# Patient Record
Sex: MALE | Race: WHITE | NOT HISPANIC OR LATINO
[De-identification: names, ages, dates, MRNs, and addresses within clinical notes are randomized per-mention and may not be internally consistent; named-entity substitution may affect disease eponyms.]

---

## 2021-02-03 ENCOUNTER — APPOINTMENT (OUTPATIENT)
Dept: FAMILY MEDICINE | Facility: CLINIC | Age: 66
End: 2021-02-03
Payer: MEDICARE

## 2021-02-03 ENCOUNTER — NON-APPOINTMENT (OUTPATIENT)
Age: 66
End: 2021-02-03

## 2021-02-03 VITALS
DIASTOLIC BLOOD PRESSURE: 70 MMHG | BODY MASS INDEX: 22.89 KG/M2 | HEIGHT: 72 IN | WEIGHT: 169 LBS | SYSTOLIC BLOOD PRESSURE: 150 MMHG | HEART RATE: 68 BPM

## 2021-02-03 DIAGNOSIS — Z78.9 OTHER SPECIFIED HEALTH STATUS: ICD-10-CM

## 2021-02-03 DIAGNOSIS — F17.290 NICOTINE DEPENDENCE, OTHER TOBACCO PRODUCT, UNCOMPLICATED: ICD-10-CM

## 2021-02-03 PROCEDURE — 93000 ELECTROCARDIOGRAM COMPLETE: CPT | Mod: 59

## 2021-02-03 PROCEDURE — G0442 ANNUAL ALCOHOL SCREEN 15 MIN: CPT | Mod: 59

## 2021-02-03 PROCEDURE — G0438: CPT

## 2021-02-05 NOTE — PLAN
[FreeTextEntry1] : # Blood work script\par # GI referral provided for colonoscopy\par # Follow up in 6 weeks to monitor BP

## 2021-02-05 NOTE — ASSESSMENT
[FreeTextEntry1] : RANDI HERNANDEZ is a 65 year year old male presenting to the clinic today as new patient to establish care.\par \par # PE vitals obtained - slightly elevated BP \par # Discussed relevant FMHx\par # EKG performed

## 2021-02-05 NOTE — HEALTH RISK ASSESSMENT
[Good] : ~his/her~  mood as  good [] : Yes [No] : In the past 12 months have you used drugs other than those required for medical reasons? 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

## 2021-02-05 NOTE — HISTORY OF PRESENT ILLNESS
[FreeTextEntry1] : new patient here for CPE to establish care [de-identified] : Mr. RANDI HERNANDEZ is a 65 year  year old male patient presenting to the clinic today to establish care. Mood is normal. Patient reports following a healthy diet and moderately active lifestyle. Patient chose to establish care with me as per his wife's request. Patient was last seen be a physician in July of 2020. Last colonoscopy was done 10 years ago. Currently not taking any medications. No known/relevant medical history. This patient has undergone three hernia repair surgical procedures, inguinal and umbilical. Relevant family medical history includes HLD on both maternal and paternal sides of the family (parents and siblings included). No known allergies to food/medications. Denies use or consumption of illicit drugs/alcohol. Admits to smoking 1 cigar a day and regularly consuming caffeine. Patient is currently employed as the owner of [employer]. No recent hospitalizations. Patient has no comments or concerns at this time.

## 2021-02-05 NOTE — END OF VISIT
[FreeTextEntry2] : This note was written by SALO MOTA on 02/03/2021, acting solely as a scribe for Dr. Moni Chen MD. \par \par All medical record entries made by the scribe, SALO MOTA, were at my, Dr. Yareli Darden MD, direction and personally dictated by me on 02/03/2021. I have personally reviewed the chart and agree that the record accurately reflects my personal performance and care.

## 2021-02-17 ENCOUNTER — APPOINTMENT (OUTPATIENT)
Dept: FAMILY MEDICINE | Facility: CLINIC | Age: 66
End: 2021-02-17
Payer: MEDICARE

## 2021-02-17 VITALS
WEIGHT: 170 LBS | HEIGHT: 72 IN | DIASTOLIC BLOOD PRESSURE: 70 MMHG | HEART RATE: 77 BPM | BODY MASS INDEX: 23.03 KG/M2 | SYSTOLIC BLOOD PRESSURE: 132 MMHG

## 2021-02-17 DIAGNOSIS — D22.9 MELANOCYTIC NEVI, UNSPECIFIED: ICD-10-CM

## 2021-02-17 LAB
25(OH)D3 SERPL-MCNC: 12.2 NG/ML
ALBUMIN SERPL ELPH-MCNC: 4.1 G/DL
ALP BLD-CCNC: 69 U/L
ALT SERPL-CCNC: 15 U/L
ANION GAP SERPL CALC-SCNC: 11 MMOL/L
AST SERPL-CCNC: 14 U/L
BASOPHILS # BLD AUTO: 0.04 K/UL
BASOPHILS NFR BLD AUTO: 0.7 %
BILIRUB SERPL-MCNC: 0.2 MG/DL
BUN SERPL-MCNC: 17 MG/DL
CALCIUM SERPL-MCNC: 9.1 MG/DL
CHLORIDE SERPL-SCNC: 105 MMOL/L
CHOLEST SERPL-MCNC: 183 MG/DL
CO2 SERPL-SCNC: 23 MMOL/L
CREAT SERPL-MCNC: 0.94 MG/DL
EOSINOPHIL # BLD AUTO: 0.17 K/UL
EOSINOPHIL NFR BLD AUTO: 3.1 %
ESTIMATED AVERAGE GLUCOSE: 120 MG/DL
FERRITIN SERPL-MCNC: 64 NG/ML
GLUCOSE SERPL-MCNC: 170 MG/DL
HBA1C MFR BLD HPLC: 5.8 %
HCT VFR BLD CALC: 45.1 %
HCV AB SER QL: NONREACTIVE
HCV S/CO RATIO: 0.05 S/CO
HDLC SERPL-MCNC: 34 MG/DL
HGB BLD-MCNC: 14.6 G/DL
IMM GRANULOCYTES NFR BLD AUTO: 0.4 %
LDLC SERPL CALC-MCNC: 125 MG/DL
LYMPHOCYTES # BLD AUTO: 1.48 K/UL
LYMPHOCYTES NFR BLD AUTO: 27.2 %
MAN DIFF?: NORMAL
MCHC RBC-ENTMCNC: 32.4 GM/DL
MCHC RBC-ENTMCNC: 32.9 PG
MCV RBC AUTO: 101.6 FL
MONOCYTES # BLD AUTO: 0.6 K/UL
MONOCYTES NFR BLD AUTO: 11 %
NEUTROPHILS # BLD AUTO: 3.14 K/UL
NEUTROPHILS NFR BLD AUTO: 57.6 %
NONHDLC SERPL-MCNC: 149 MG/DL
PLATELET # BLD AUTO: 198 K/UL
POTASSIUM SERPL-SCNC: 4.3 MMOL/L
PROT SERPL-MCNC: 6.1 G/DL
PSA FREE FLD-MCNC: 11 %
PSA FREE SERPL-MCNC: 0.33 NG/ML
PSA SERPL-MCNC: 3.07 NG/ML
RBC # BLD: 4.44 M/UL
RBC # FLD: 13.5 %
SODIUM SERPL-SCNC: 140 MMOL/L
T4 FREE SERPL-MCNC: 1.1 NG/DL
TRIGL SERPL-MCNC: 119 MG/DL
TSH SERPL-ACNC: 3.42 UIU/ML
WBC # FLD AUTO: 5.45 K/UL

## 2021-02-17 PROCEDURE — 99213 OFFICE O/P EST LOW 20 MIN: CPT

## 2021-02-19 ENCOUNTER — TRANSCRIPTION ENCOUNTER (OUTPATIENT)
Age: 66
End: 2021-02-19

## 2021-02-21 NOTE — ASSESSMENT
[FreeTextEntry1] : ASSESSMENT: Mr. RANDI HERNANDEZ is a 65 year old male presenting to the clinic today regarding CPE follow up.\par \par # PE/vitals obtained - normal\par # Reviewed previous blood work - HgA1C: 5.8 pre-diabetic; Vitamin D deficiency; HDL: 34 LDL: 145\par # Large mole on R side of the forehead

## 2021-02-21 NOTE — PLAN
[FreeTextEntry1] : # Start - 5,000 UT Vitamin D\par # Advised to f/u with GI Dr. Jha for colonoscopy \par # Encouraged to f/u with mole of R forehead\par # Follow up in 6 months

## 2021-02-21 NOTE — END OF VISIT
[FreeTextEntry2] : This note was written by SALO MOTA on 02/17/2021, acting solely as a scribe for Dr. Moni Chen MD. \par \par All medical record entries made by the scribe, SALO MOTA, were at my, Dr. Yareli Darden MD, direction and personally dictated by me on 02/17/2021. I have personally reviewed the chart and agree that the record accurately reflects my personal performance and care.

## 2021-02-21 NOTE — ADDENDUM
[FreeTextEntry1] : I, Emma Chavez, verify that that I acted solely as a scribe for Dr. Moni Chen on this date, 02/17/2021.

## 2021-02-21 NOTE — HISTORY OF PRESENT ILLNESS
[FreeTextEntry1] : patient here for follow up [de-identified] : RANDI HERNANDEZ is a 65 year old male presenting to the clinic today for a follow up regarding previous visit's elevated blood pressure. Mood is good, appears well. Patient describes his diet as free from fried foods, includes lots of fruits and vegetables, peanuts, coffee and carbohydrates

## 2021-02-25 ENCOUNTER — APPOINTMENT (OUTPATIENT)
Dept: GASTROENTEROLOGY | Facility: CLINIC | Age: 66
End: 2021-02-25
Payer: MEDICARE

## 2021-02-25 VITALS
DIASTOLIC BLOOD PRESSURE: 96 MMHG | BODY MASS INDEX: 22.75 KG/M2 | TEMPERATURE: 98.2 F | HEART RATE: 70 BPM | HEIGHT: 72 IN | OXYGEN SATURATION: 98 % | WEIGHT: 168 LBS | SYSTOLIC BLOOD PRESSURE: 178 MMHG | RESPIRATION RATE: 14 BRPM

## 2021-02-25 DIAGNOSIS — K63.5 POLYP OF COLON: ICD-10-CM

## 2021-02-25 DIAGNOSIS — Z12.11 ENCOUNTER FOR SCREENING FOR MALIGNANT NEOPLASM OF COLON: ICD-10-CM

## 2021-02-25 DIAGNOSIS — Z78.9 OTHER SPECIFIED HEALTH STATUS: ICD-10-CM

## 2021-02-25 PROCEDURE — 99202 OFFICE O/P NEW SF 15 MIN: CPT

## 2021-02-25 NOTE — PHYSICAL EXAM
[General Appearance - Alert] : alert [General Appearance - In No Acute Distress] : in no acute distress [Sclera] : the sclera and conjunctiva were normal [PERRL With Normal Accommodation] : pupils were equal in size, round, and reactive to light [Extraocular Movements] : extraocular movements were intact [Outer Ear] : the ears and nose were normal in appearance [Oropharynx] : the oropharynx was normal [Neck Appearance] : the appearance of the neck was normal [Neck Cervical Mass (___cm)] : no neck mass was observed [Jugular Venous Distention Increased] : there was no jugular-venous distention [Thyroid Diffuse Enlargement] : the thyroid was not enlarged [Thyroid Nodule] : there were no palpable thyroid nodules [Auscultation Breath Sounds / Voice Sounds] : lungs were clear to auscultation bilaterally [Heart Rate And Rhythm] : heart rate was normal and rhythm regular [Heart Sounds] : normal S1 and S2 [Heart Sounds Gallop] : no gallops [Murmurs] : no murmurs [Heart Sounds Pericardial Friction Rub] : no pericardial rub [Bowel Sounds] : normal bowel sounds [Abdomen Soft] : soft [Abdomen Tenderness] : non-tender [Abdomen Mass (___ Cm)] : no abdominal mass palpated [Abnormal Walk] : normal gait [Nail Clubbing] : no clubbing  or cyanosis of the fingernails [Musculoskeletal - Swelling] : no joint swelling seen [Motor Tone] : muscle strength and tone were normal [Skin Color & Pigmentation] : normal skin color and pigmentation [Skin Turgor] : normal skin turgor [] : no rash [Oriented To Time, Place, And Person] : oriented to person, place, and time [Impaired Insight] : insight and judgment were intact [Affect] : the affect was normal

## 2021-02-25 NOTE — REASON FOR VISIT
[Initial Evaluation] : an initial evaluation [FreeTextEntry1] : colon cancer screening, history of colon polyps, father with colon cancer

## 2021-02-25 NOTE — ASSESSMENT
[FreeTextEntry1] : The patient presents today for colon cancer screening. He is at increased risk for colorectal neoplasm due to a personal history of colon polyps and a family history of colon cancer. He will be scheduled for a colonoscopy with MiraLAX prep. I have discussed the indications (including but not limited to ruling out inflammatory bowel disease, colorectal neoplasm, GI bleed, and AVM's), benefits, risks  (including but not limited to reaction to the anesthesia, infection, bleeding, and perforation),  and alternatives to colonoscopy with the patient. The patient understands all options and has agreed to have a colonoscopy and is medically optimized for the planned procedure.

## 2021-02-25 NOTE — HISTORY OF PRESENT ILLNESS
[Heartburn] : denies heartburn [Nausea] : denies nausea [Vomiting] : denies vomiting [Diarrhea] : denies diarrhea [Constipation] : denies constipation [Yellow Skin Or Eyes (Jaundice)] : denies jaundice [Abdominal Pain] : denies abdominal pain [Abdominal Swelling] : denies abdominal swelling [Rectal Pain] : denies rectal pain [de-identified] : RANDI HERNANDEZ is a 65 year old male presenting today for colon cancer screening due to a personal history of colon polyps and a family history of colon cancer in his father. His last colonosocpy was in 2010 with Dr. Salomon and was normal. He had previous colonoscopies where he had polyps removed. His father passed away of colon and bile duct cancer. He denies any other family history of colon cancer or polyps. He feels well and offers no complaints. He denies any abdominal pain, constipation, diarrhea, black or bloody stools. He moves his bowels daily. He denies any upper GI symptoms. His has no significant medical history. He has had 3 hernia repairs over 10 years ago, an umbilical hernia and bilateral inguinal hernia. His BMI is 22.79.

## 2021-03-05 DIAGNOSIS — Z01.818 ENCOUNTER FOR OTHER PREPROCEDURAL EXAMINATION: ICD-10-CM

## 2021-03-06 ENCOUNTER — APPOINTMENT (OUTPATIENT)
Dept: DISASTER EMERGENCY | Facility: CLINIC | Age: 66
End: 2021-03-06

## 2021-03-07 LAB — SARS-COV-2 N GENE NPH QL NAA+PROBE: NOT DETECTED

## 2021-03-09 ENCOUNTER — NON-APPOINTMENT (OUTPATIENT)
Age: 66
End: 2021-03-09

## 2021-03-09 ENCOUNTER — APPOINTMENT (OUTPATIENT)
Dept: GASTROENTEROLOGY | Facility: GI CENTER | Age: 66
End: 2021-03-09
Payer: MEDICARE

## 2021-03-09 ENCOUNTER — RESULT REVIEW (OUTPATIENT)
Age: 66
End: 2021-03-09

## 2021-03-09 ENCOUNTER — OUTPATIENT (OUTPATIENT)
Dept: OUTPATIENT SERVICES | Facility: HOSPITAL | Age: 66
LOS: 1 days | End: 2021-03-09
Payer: MEDICARE

## 2021-03-09 DIAGNOSIS — Z80.0 ENCOUNTER FOR SCREENING FOR MALIGNANT NEOPLASM OF COLON: ICD-10-CM

## 2021-03-09 DIAGNOSIS — Z12.11 ENCOUNTER FOR SCREENING FOR MALIGNANT NEOPLASM OF COLON: ICD-10-CM

## 2021-03-09 PROCEDURE — 45380 COLONOSCOPY AND BIOPSY: CPT | Mod: PT

## 2021-03-09 PROCEDURE — 88305 TISSUE EXAM BY PATHOLOGIST: CPT

## 2021-03-09 PROCEDURE — 45385 COLONOSCOPY W/LESION REMOVAL: CPT | Mod: PT

## 2021-03-09 PROCEDURE — 88305 TISSUE EXAM BY PATHOLOGIST: CPT | Mod: 26

## 2021-03-09 NOTE — PROCEDURE
[Colon Cancer Screening] : colon cancer screening [Fm Hx of Colon Ca/Polyps] : family history of colon cancer and/or polyps [Hx of Colon Polyps] : history of colon polyps [Procedure Explained] : The procedure was explained [Allergies Reviewed] : allergies reviewed. [Patient] : patient [Risks] : Risks [Benefits] : benefits [Alternatives] : alternatives [Bleeding] : bleeding risk [Infection] : risk of infection [Consent Obtained] : written consent was obtained prior to the procedure and is detailed in the patient's record

## 2021-03-11 LAB — SURGICAL PATHOLOGY STUDY: SIGNIFICANT CHANGE UP

## 2021-03-16 ENCOUNTER — APPOINTMENT (OUTPATIENT)
Dept: FAMILY MEDICINE | Facility: CLINIC | Age: 66
End: 2021-03-16

## 2021-03-16 ENCOUNTER — NON-APPOINTMENT (OUTPATIENT)
Age: 66
End: 2021-03-16

## 2021-03-16 ENCOUNTER — APPOINTMENT (OUTPATIENT)
Dept: DERMATOLOGY | Facility: CLINIC | Age: 66
End: 2021-03-16
Payer: MEDICARE

## 2021-03-16 DIAGNOSIS — D22.9 MELANOCYTIC NEVI, UNSPECIFIED: ICD-10-CM

## 2021-03-16 DIAGNOSIS — Z12.83 ENCOUNTER FOR SCREENING FOR MALIGNANT NEOPLASM OF SKIN: ICD-10-CM

## 2021-03-16 DIAGNOSIS — D48.9 NEOPLASM OF UNCERTAIN BEHAVIOR, UNSPECIFIED: ICD-10-CM

## 2021-03-16 DIAGNOSIS — L81.4 OTHER MELANIN HYPERPIGMENTATION: ICD-10-CM

## 2021-03-16 PROCEDURE — 11102 TANGNTL BX SKIN SINGLE LES: CPT

## 2021-03-16 PROCEDURE — 99203 OFFICE O/P NEW LOW 30 MIN: CPT | Mod: 25

## 2021-03-24 LAB — CORE LAB BIOPSY: NORMAL

## 2021-08-04 ENCOUNTER — APPOINTMENT (OUTPATIENT)
Dept: NEPHROLOGY | Facility: CLINIC | Age: 66
End: 2021-08-04

## 2022-03-15 ENCOUNTER — NON-APPOINTMENT (OUTPATIENT)
Age: 67
End: 2022-03-15

## 2022-03-15 ENCOUNTER — APPOINTMENT (OUTPATIENT)
Dept: CARDIOLOGY | Facility: CLINIC | Age: 67
End: 2022-03-15
Payer: MEDICARE

## 2022-03-15 VITALS
HEIGHT: 72 IN | TEMPERATURE: 98 F | BODY MASS INDEX: 23.3 KG/M2 | HEART RATE: 67 BPM | RESPIRATION RATE: 12 BRPM | WEIGHT: 172 LBS | DIASTOLIC BLOOD PRESSURE: 74 MMHG | OXYGEN SATURATION: 95 % | SYSTOLIC BLOOD PRESSURE: 140 MMHG

## 2022-03-15 VITALS — SYSTOLIC BLOOD PRESSURE: 142 MMHG | DIASTOLIC BLOOD PRESSURE: 74 MMHG

## 2022-03-15 DIAGNOSIS — E55.9 VITAMIN D DEFICIENCY, UNSPECIFIED: ICD-10-CM

## 2022-03-15 DIAGNOSIS — Z13.6 ENCOUNTER FOR SCREENING FOR CARDIOVASCULAR DISORDERS: ICD-10-CM

## 2022-03-15 PROCEDURE — 99205 OFFICE O/P NEW HI 60 MIN: CPT

## 2022-03-15 PROCEDURE — 93000 ELECTROCARDIOGRAM COMPLETE: CPT

## 2022-03-15 RX ORDER — ERGOCALCIFEROL 1.25 MG/1
1.25 MG CAPSULE, LIQUID FILLED ORAL
Qty: 8 | Refills: 2 | Status: DISCONTINUED | COMMUNITY
Start: 2021-02-17 | End: 2022-03-15

## 2022-03-15 RX ORDER — CHOLECALCIFEROL (VITAMIN D3) 50 MCG
2000 CAPSULE ORAL
Refills: 0 | Status: ACTIVE | COMMUNITY

## 2022-03-16 NOTE — HISTORY OF PRESENT ILLNESS
[FreeTextEntry1] : family history coronary artery disease \par \par \par This is a 66 year male with  dyslipidemia , here for coronary artery disease evaluation and elevated blood pressure without diagnosis of hypertension \par  he is here for coronary artery disease evaluation.   last time he checked his heart was 5 years ago. \par no dizziness. no syncope. no palpitaitons. no dyspnea on exertion . no chest pain . no skipped heart beats.\par family history : \par Mother: CABg and PACemaker\par father: cancer. \par Smoking: half a cigar a day.   Smoking cigarettes . a pack a day. quit smoking 15 years ago.  he smoked a pack a day for 30 years.  as

## 2022-03-16 NOTE — DISCUSSION/SUMMARY
[Patient] : the patient [Risks] : risks [Benefits] : benefits [Alternatives] : alternatives [With Me] : with me [___ Month(s)] : in [unfilled] month(s) [FreeTextEntry1] : This is a 66 year male with  dyslipidemia , here for coronary artery disease evaluation and elevated blood pressure without diagnosis of hypertension \par \par \par 1) elevated blood pressure without diagnosis of hypertension : diet and exercise. home BP recordsing s3 times a week.\par 2) coronary artery disease evaluation: cardiac cta.  metoprolol 25 mg one tablet the day fo r test. \par 3) prior smoking.: Extensive > 30 pack year history of smoiking.  AAA screening.\par 4) health mainitnace:  low vit D . lwo good cholestol. diet and exercise discussed. repeat blood work with PCP. \par 4) Will order and review ECG for the above mentioned diagnosis/condition/symptoms  az

## 2022-03-22 ENCOUNTER — APPOINTMENT (OUTPATIENT)
Dept: CARDIOLOGY | Facility: CLINIC | Age: 67
End: 2022-03-22
Payer: MEDICARE

## 2022-03-22 PROCEDURE — 93978 VASCULAR STUDY: CPT

## 2022-03-23 ENCOUNTER — TRANSCRIPTION ENCOUNTER (OUTPATIENT)
Age: 67
End: 2022-03-23

## 2022-04-14 ENCOUNTER — INPATIENT (INPATIENT)
Facility: HOSPITAL | Age: 67
LOS: 1 days | Discharge: HOME | DRG: 247 | End: 2022-04-16
Attending: STUDENT IN AN ORGANIZED HEALTH CARE EDUCATION/TRAINING PROGRAM | Admitting: STUDENT IN AN ORGANIZED HEALTH CARE EDUCATION/TRAINING PROGRAM
Payer: MEDICARE

## 2022-04-14 VITALS
DIASTOLIC BLOOD PRESSURE: 103 MMHG | SYSTOLIC BLOOD PRESSURE: 144 MMHG | RESPIRATION RATE: 18 BRPM | HEART RATE: 68 BPM | OXYGEN SATURATION: 96 %

## 2022-04-14 DIAGNOSIS — I21.3 ST ELEVATION (STEMI) MYOCARDIAL INFARCTION OF UNSPECIFIED SITE: ICD-10-CM

## 2022-04-14 LAB
ABO RH CONFIRMATION: SIGNIFICANT CHANGE UP
ALBUMIN SERPL ELPH-MCNC: 4.2 G/DL — SIGNIFICANT CHANGE UP (ref 3.3–5.2)
ALP SERPL-CCNC: 66 U/L — SIGNIFICANT CHANGE UP (ref 40–120)
ALT FLD-CCNC: 18 U/L — SIGNIFICANT CHANGE UP
ANION GAP SERPL CALC-SCNC: 13 MMOL/L — SIGNIFICANT CHANGE UP (ref 5–17)
APTT BLD: 32.7 SEC — SIGNIFICANT CHANGE UP (ref 27.5–35.5)
AST SERPL-CCNC: 22 U/L — SIGNIFICANT CHANGE UP
BASOPHILS # BLD AUTO: 0.03 K/UL — SIGNIFICANT CHANGE UP (ref 0–0.2)
BASOPHILS NFR BLD AUTO: 0.4 % — SIGNIFICANT CHANGE UP (ref 0–2)
BILIRUB SERPL-MCNC: 0.4 MG/DL — SIGNIFICANT CHANGE UP (ref 0.4–2)
BLD GP AB SCN SERPL QL: SIGNIFICANT CHANGE UP
BUN SERPL-MCNC: 17.1 MG/DL — SIGNIFICANT CHANGE UP (ref 8–20)
CALCIUM SERPL-MCNC: 9.2 MG/DL — SIGNIFICANT CHANGE UP (ref 8.6–10.2)
CHLORIDE SERPL-SCNC: 104 MMOL/L — SIGNIFICANT CHANGE UP (ref 98–107)
CK SERPL-CCNC: 138 U/L — SIGNIFICANT CHANGE UP (ref 30–200)
CO2 SERPL-SCNC: 24 MMOL/L — SIGNIFICANT CHANGE UP (ref 22–29)
CREAT SERPL-MCNC: 0.89 MG/DL — SIGNIFICANT CHANGE UP (ref 0.5–1.3)
EGFR: 93 ML/MIN/1.73M2 — SIGNIFICANT CHANGE UP
EOSINOPHIL # BLD AUTO: 0.17 K/UL — SIGNIFICANT CHANGE UP (ref 0–0.5)
EOSINOPHIL NFR BLD AUTO: 2 % — SIGNIFICANT CHANGE UP (ref 0–6)
GLUCOSE SERPL-MCNC: 129 MG/DL — HIGH (ref 70–99)
HCT VFR BLD CALC: 42.5 % — SIGNIFICANT CHANGE UP (ref 39–50)
HGB BLD-MCNC: 14.5 G/DL — SIGNIFICANT CHANGE UP (ref 13–17)
IMM GRANULOCYTES NFR BLD AUTO: 0.2 % — SIGNIFICANT CHANGE UP (ref 0–1.5)
INR BLD: 1.01 RATIO — SIGNIFICANT CHANGE UP (ref 0.88–1.16)
LYMPHOCYTES # BLD AUTO: 1.91 K/UL — SIGNIFICANT CHANGE UP (ref 1–3.3)
LYMPHOCYTES # BLD AUTO: 22.6 % — SIGNIFICANT CHANGE UP (ref 13–44)
MAGNESIUM SERPL-MCNC: 1.9 MG/DL — SIGNIFICANT CHANGE UP (ref 1.6–2.6)
MCHC RBC-ENTMCNC: 33.2 PG — SIGNIFICANT CHANGE UP (ref 27–34)
MCHC RBC-ENTMCNC: 34.1 GM/DL — SIGNIFICANT CHANGE UP (ref 32–36)
MCV RBC AUTO: 97.3 FL — SIGNIFICANT CHANGE UP (ref 80–100)
MONOCYTES # BLD AUTO: 0.85 K/UL — SIGNIFICANT CHANGE UP (ref 0–0.9)
MONOCYTES NFR BLD AUTO: 10.1 % — SIGNIFICANT CHANGE UP (ref 2–14)
NEUTROPHILS # BLD AUTO: 5.46 K/UL — SIGNIFICANT CHANGE UP (ref 1.8–7.4)
NEUTROPHILS NFR BLD AUTO: 64.7 % — SIGNIFICANT CHANGE UP (ref 43–77)
NT-PROBNP SERPL-SCNC: 172 PG/ML — SIGNIFICANT CHANGE UP (ref 0–300)
PLATELET # BLD AUTO: 199 K/UL — SIGNIFICANT CHANGE UP (ref 150–400)
POTASSIUM SERPL-MCNC: 4 MMOL/L — SIGNIFICANT CHANGE UP (ref 3.5–5.3)
POTASSIUM SERPL-SCNC: 4 MMOL/L — SIGNIFICANT CHANGE UP (ref 3.5–5.3)
PROT SERPL-MCNC: 6.8 G/DL — SIGNIFICANT CHANGE UP (ref 6.6–8.7)
PROTHROM AB SERPL-ACNC: 11.7 SEC — SIGNIFICANT CHANGE UP (ref 10.5–13.4)
RBC # BLD: 4.37 M/UL — SIGNIFICANT CHANGE UP (ref 4.2–5.8)
RBC # FLD: 13.2 % — SIGNIFICANT CHANGE UP (ref 10.3–14.5)
SARS-COV-2 RNA SPEC QL NAA+PROBE: SIGNIFICANT CHANGE UP
SODIUM SERPL-SCNC: 140 MMOL/L — SIGNIFICANT CHANGE UP (ref 135–145)
TROPONIN T SERPL-MCNC: 0.02 NG/ML — SIGNIFICANT CHANGE UP (ref 0–0.06)
WBC # BLD: 8.44 K/UL — SIGNIFICANT CHANGE UP (ref 3.8–10.5)
WBC # FLD AUTO: 8.44 K/UL — SIGNIFICANT CHANGE UP (ref 3.8–10.5)

## 2022-04-14 PROCEDURE — 93010 ELECTROCARDIOGRAM REPORT: CPT

## 2022-04-14 PROCEDURE — 71045 X-RAY EXAM CHEST 1 VIEW: CPT | Mod: 26

## 2022-04-14 PROCEDURE — 93010 ELECTROCARDIOGRAM REPORT: CPT | Mod: 77

## 2022-04-14 PROCEDURE — 99285 EMERGENCY DEPT VISIT HI MDM: CPT

## 2022-04-14 RX ORDER — ASPIRIN/CALCIUM CARB/MAGNESIUM 324 MG
81 TABLET ORAL DAILY
Refills: 0 | Status: DISCONTINUED | OUTPATIENT
Start: 2022-04-15 | End: 2022-04-16

## 2022-04-14 RX ORDER — ATORVASTATIN CALCIUM 80 MG/1
80 TABLET, FILM COATED ORAL AT BEDTIME
Refills: 0 | Status: DISCONTINUED | OUTPATIENT
Start: 2022-04-14 | End: 2022-04-16

## 2022-04-14 RX ORDER — TICAGRELOR 90 MG/1
90 TABLET ORAL
Refills: 0 | Status: DISCONTINUED | OUTPATIENT
Start: 2022-04-15 | End: 2022-04-16

## 2022-04-14 RX ORDER — VALSARTAN 80 MG/1
80 TABLET ORAL DAILY
Refills: 0 | Status: DISCONTINUED | OUTPATIENT
Start: 2022-04-14 | End: 2022-04-16

## 2022-04-14 RX ORDER — METOPROLOL TARTRATE 50 MG
25 TABLET ORAL
Refills: 0 | Status: DISCONTINUED | OUTPATIENT
Start: 2022-04-14 | End: 2022-04-16

## 2022-04-14 RX ORDER — TICAGRELOR 90 MG/1
180 TABLET ORAL ONCE
Refills: 0 | Status: COMPLETED | OUTPATIENT
Start: 2022-04-14 | End: 2022-04-14

## 2022-04-14 RX ADMIN — TICAGRELOR 180 MILLIGRAM(S): 90 TABLET ORAL at 21:08

## 2022-04-14 NOTE — ED ADULT NURSE NOTE - OBJECTIVE STATEMENT
Pt. BIBA for chest pain. Pt. had ST elevations on EMS ekg. Pt. states he started having chest pain this afternoon, it resolved, then started again after dinner. Pt. states he took 324 baby ASA at home then given 2 nitros by EMS with relief of chest pain. Pt. denies SOB or radiation. Code STEMI activated. MD Lucia at bedside with cardio RAFAELA Garsia.

## 2022-04-14 NOTE — H&P CARDIOLOGY - COMMENTS
Cath results: 100% D1 lesion AZAEL x1   Post Cath EKG: SB 54bpm with 1st degree AVB, no ST changes    - ADMIT to PACU    -post cardiac cath orders  -groin precautions  -bedrest x 4 hours post procedure  -EKG post cath  -labs and EKG in am  -continue current medical therapy  -Dual anti platelet therapy with aspirin/brilinta, reinforced importance of strict adherence to DAPT   -statin therapy  -no beta blocker given bradycardia/RCA lesion  -follow up outpt in 2 weeks with Cardiologist: Dr. Bonner   -Lifestyle modifications discussed to reduce cardiovascular risk factors including weight reduction, smoking cessation, medication compliance, and routine follow up with Cardiologist to track your BMI, cholesterol, and glucose levels.   - cardiac rehab info provided/referral and communication to cardiac rehab completed   -Discharge in am if overnight tele, EKG, labs in am all remain WNL Cath results: 100% D1 lesion AZAEL x1   Post Cath EKG: SB 54bpm with 1st degree AVB, no ST changes    - ADMIT to PACU    - post cardiac cath orders  - groin precautions  - bedrest x 4 hours post procedure  - EKG post cath, labs & EKG in am  - DAPT with Aspirin/Brilinta, reinforced importance of strict adherence to DAPT   - started on Lipitor 80mg nightly therapy  - Beta blocker, ARB started hold for bradycardia  - follow up outpt in 2 weeks with Cardiologist: Dr. Newsome at [facility]   - Lifestyle modifications discussed to reduce cardiovascular risk factors including weight reduction, smoking cessation, medication compliance, and routine follow up with Cardiologist to track your BMI, cholesterol, and glucose levels.   - discharge in 48 hours, EKG, labs in am all remain WNL

## 2022-04-14 NOTE — ED PROVIDER NOTE - ATTENDING CONTRIBUTION TO CARE
Pt seen and evaluated with resident.  Pt with sudden onset of CP at rest. Pt received ASA and NTG x 2 with pain 9/10 to 1/10 on arrival.  EKG with acute lateral wall STEMI.  Code STEMI activated.  Case d/w Interventionalist/Dr. Newsome and will be in to take pt to cath lab

## 2022-04-14 NOTE — ED PROVIDER NOTE - CLINICAL SUMMARY MEDICAL DECISION MAKING FREE TEXT BOX
67y M presenting for chest pain. EKG in the field showing ST elevations in I and aVL, with ST depressions in II, III and aVF. Code STEMI activated on arrival. Rpt EKG in the ED unchanged. Given ASA and nitro prior to arrival. Will load with Brilinta. Pt to be taken to cath lab.

## 2022-04-14 NOTE — ED ADULT TRIAGE NOTE - CHIEF COMPLAINT QUOTE
C/o chest pain radiating to left arm. EMS administered 324mg of ASA and 2 doses of SL nitro. Denies medical hx. STEMI activated as per MD.

## 2022-04-14 NOTE — ED PROVIDER NOTE - OBJECTIVE STATEMENT
66 y/o male with no PMHx presents to ED c/o chest pain. Patient reports this afternoon he developed chest pain that radiated down his left arm, but subsided. The pain returned tonight while sitting on the couch, pain was 9/10. EMS gave 324mg of Aspirin, and 2 Nitro PTA in ED, patient reports pain is down to 1/10.     Denies allergies, taking medications, shortness of breath, N/V 68 y/o male with no PMHx presents to ED c/o chest pain. Patient reports this afternoon he developed chest pain that radiated down his left arm, but subsided. The pain returned tonight while sitting on the couch, pain was 9/10. EMS gave 324mg of Aspirin, and 2 Nitro PTA in ED, patient reports pain is down to 1/10 after receiving Nitro. Denies shortness of breath, nausea, vomiting, dizziness, diaphoresis. Saw Dr. Lopez of cardiology 1 week ago.     Denies allergies, taking medications

## 2022-04-14 NOTE — H&P CARDIOLOGY - HISTORY OF PRESENT ILLNESS
66yo M with PMHx of tobacco use, family CAD presents to [facility] after patient developed acute onset of chest pain.  Patient reports this afternoon he developed vague L sided chest pain with radiation down his left arm, but with rest subsided. The while eating supper, pain returned and it sharp, 9/10 in severity, L sided, with radiation to LUE.  EMA called and patient received 324mg ASA and Nitro SL x2.  In [facility] patient reports pain is down to 1/10 after receiving Nitro and resting comfortable.  Denies associated shortness of breath, nausea, vomiting, dizziness, diaphoresis or HA.  Followed by [facility] Dr. Lopez.  ECG in [facility] c/w lateral STEMI.  Code STEMI and Cath transfer.

## 2022-04-15 LAB
A1C WITH ESTIMATED AVERAGE GLUCOSE RESULT: 6 % — HIGH (ref 4–5.6)
ALBUMIN SERPL ELPH-MCNC: 4 G/DL — SIGNIFICANT CHANGE UP (ref 3.3–5.2)
ALP SERPL-CCNC: 63 U/L — SIGNIFICANT CHANGE UP (ref 40–120)
ALT FLD-CCNC: 24 U/L — SIGNIFICANT CHANGE UP
ANION GAP SERPL CALC-SCNC: 14 MMOL/L — SIGNIFICANT CHANGE UP (ref 5–17)
AST SERPL-CCNC: 82 U/L — HIGH
BILIRUB SERPL-MCNC: 0.5 MG/DL — SIGNIFICANT CHANGE UP (ref 0.4–2)
BUN SERPL-MCNC: 17.2 MG/DL — SIGNIFICANT CHANGE UP (ref 8–20)
CALCIUM SERPL-MCNC: 8.7 MG/DL — SIGNIFICANT CHANGE UP (ref 8.6–10.2)
CHLORIDE SERPL-SCNC: 104 MMOL/L — SIGNIFICANT CHANGE UP (ref 98–107)
CHOLEST SERPL-MCNC: 209 MG/DL — HIGH
CO2 SERPL-SCNC: 20 MMOL/L — LOW (ref 22–29)
CREAT SERPL-MCNC: 0.69 MG/DL — SIGNIFICANT CHANGE UP (ref 0.5–1.3)
EGFR: 100 ML/MIN/1.73M2 — SIGNIFICANT CHANGE UP
ESTIMATED AVERAGE GLUCOSE: 126 MG/DL — HIGH (ref 68–114)
GLUCOSE SERPL-MCNC: 101 MG/DL — HIGH (ref 70–99)
HDLC SERPL-MCNC: 39 MG/DL — LOW
LIPID PNL WITH DIRECT LDL SERPL: 152 MG/DL — HIGH
NON HDL CHOLESTEROL: 170 MG/DL — HIGH
POTASSIUM SERPL-MCNC: 4 MMOL/L — SIGNIFICANT CHANGE UP (ref 3.5–5.3)
POTASSIUM SERPL-SCNC: 4 MMOL/L — SIGNIFICANT CHANGE UP (ref 3.5–5.3)
PROT SERPL-MCNC: 6.3 G/DL — LOW (ref 6.6–8.7)
SODIUM SERPL-SCNC: 138 MMOL/L — SIGNIFICANT CHANGE UP (ref 135–145)
TRIGL SERPL-MCNC: 90 MG/DL — SIGNIFICANT CHANGE UP
TROPONIN T SERPL-MCNC: 1.29 NG/ML — HIGH (ref 0–0.06)

## 2022-04-15 PROCEDURE — 99223 1ST HOSP IP/OBS HIGH 75: CPT

## 2022-04-15 PROCEDURE — 93010 ELECTROCARDIOGRAM REPORT: CPT

## 2022-04-15 PROCEDURE — 99233 SBSQ HOSP IP/OBS HIGH 50: CPT

## 2022-04-15 RX ORDER — ENOXAPARIN SODIUM 100 MG/ML
40 INJECTION SUBCUTANEOUS EVERY 24 HOURS
Refills: 0 | Status: DISCONTINUED | OUTPATIENT
Start: 2022-04-15 | End: 2022-04-16

## 2022-04-15 RX ADMIN — Medication 81 MILLIGRAM(S): at 11:33

## 2022-04-15 RX ADMIN — ATORVASTATIN CALCIUM 80 MILLIGRAM(S): 80 TABLET, FILM COATED ORAL at 21:28

## 2022-04-15 RX ADMIN — TICAGRELOR 90 MILLIGRAM(S): 90 TABLET ORAL at 05:47

## 2022-04-15 RX ADMIN — TICAGRELOR 90 MILLIGRAM(S): 90 TABLET ORAL at 17:11

## 2022-04-15 RX ADMIN — VALSARTAN 80 MILLIGRAM(S): 80 TABLET ORAL at 05:47

## 2022-04-15 RX ADMIN — Medication 25 MILLIGRAM(S): at 17:11

## 2022-04-15 NOTE — CONSULT NOTE ADULT - SUBJECTIVE AND OBJECTIVE BOX
Patient is a 67y old  Male who presents with a chief complaint of     BRIEF HOSPITAL COURSE:   68yo M with PMHx of tobacco use, family CAD presents to [facility] ED on 4/14/22 with acute L-sided chest pain, found with STD in I and aVL, taken to cath lab and found with 100% occlusion of D1 s/p AZAEL x1. Given , Brilinta 180mg and nitro sublingual x2, and continued on DAPT.     PAST MEDICAL & SURGICAL HISTORY:  History of tobacco use      Allergies    No Known Allergies    Intolerances      FAMILY HISTORY:      Family history otherwise noncontributory.    Social History: Former smoker    Review of Systems:  CONSTITUTIONAL:  No fevers, chills  HEAD: No headache  EYES: No blurry vision  ENT: No epistaxis, rhinorrhea, sore throat  CARDIOVASCULAR:  L sided chest pain prior to admission  RESPIRATORY:  As per HPI  GASTROINTESTINAL:  No abdominal pain, N/V/D  GENITOURINARY:  No dysuria, frequency or urgency  NEUROLOGIC:  No seizures or headaches  PSYCHIATRIC:  No disorder of thought or mood    ALL OTHER REVIEW OF SYSTEMS EXCEPT PER HPI NEGATIVE.      Medications:    metoprolol tartrate 25 milliGRAM(s) Oral two times a day  valsartan 80 milliGRAM(s) Oral daily          aspirin enteric coated 81 milliGRAM(s) Oral daily  ticagrelor 90 milliGRAM(s) Oral two times a day        atorvastatin 80 milliGRAM(s) Oral at bedtime                  ICU Vital Signs Last 24 Hrs  T(C): 36.7 (14 Apr 2022 22:42), Max: 36.7 (14 Apr 2022 20:55)  T(F): 98 (14 Apr 2022 22:42), Max: 98.1 (14 Apr 2022 20:55)  HR: 60 (15 Apr 2022 00:15) (53 - 70)  BP: 131/72 (15 Apr 2022 00:15) (127/74 - 182/87)  BP(mean): 87 (15 Apr 2022 00:15) (83 - 100)  ABP: --  ABP(mean): --  RR: 18 (15 Apr 2022 00:15) (16 - 21)  SpO2: 96% (15 Apr 2022 00:15) (96% - 99%)    Vital Signs Last 24 Hrs  T(C): 36.7 (14 Apr 2022 22:42), Max: 36.7 (14 Apr 2022 20:55)  T(F): 98 (14 Apr 2022 22:42), Max: 98.1 (14 Apr 2022 20:55)  HR: 60 (15 Apr 2022 00:15) (53 - 70)  BP: 131/72 (15 Apr 2022 00:15) (127/74 - 182/87)  BP(mean): 87 (15 Apr 2022 00:15) (83 - 100)  RR: 18 (15 Apr 2022 00:15) (16 - 21)  SpO2: 96% (15 Apr 2022 00:15) (96% - 99%)        I&O's Detail        LABS:                        14.5   8.44  )-----------( 199      ( 14 Apr 2022 21:07 )             42.5     04-14    140  |  104  |  17.1  ----------------------------<  129<H>  4.0   |  24.0  |  0.89    Ca    9.2      14 Apr 2022 21:07  Mg     1.9     04-14    TPro  6.8  /  Alb  4.2  /  TBili  0.4  /  DBili  x   /  AST  22  /  ALT  18  /  AlkPhos  66  04-14      CARDIAC MARKERS ( 14 Apr 2022 21:07 )  x     / 0.02 ng/mL / 138 U/L / x     / x          CAPILLARY BLOOD GLUCOSE        PT/INR - ( 14 Apr 2022 21:07 )   PT: 11.7 sec;   INR: 1.01 ratio         PTT - ( 14 Apr 2022 21:07 )  PTT:32.7 sec    CULTURES:      Physical Examination:  GENERAL: In NAD   HEENT: NC/AT  NECK: Supple, trachea midline  PULM: CTA anteriorly  CVS: +S1, S2  ABD: Soft, non-tender  EXTREMITIES: No pedal edema B/L  SKIN: No open wounds  NEURO: Grossly non-focal    DEVICES:     RADIOLOGY:

## 2022-04-15 NOTE — CONSULT NOTE ADULT - ASSESSMENT
68yo M with PMHx of tobacco use, family CAD presents to [facility] ED on 4/14/22 with acute L-sided chest pain, found with STD in I and aVL, taken to cath lab and found with 100% occlusion of D1 s/p AZAEL x1    Impression/Plan:    Acute MI  S/p LHC with 100% occlusion of D1 s/p AZAEL x1  - C/w DAPT - ASA/Brilinta  - Lipitor 80  - Lopressor 25mg BID  - Valsartan 80mg daily  - Monitor for further chest pain  - Monitor rhythm on telemetry  - Checking HgbA1c, lipid panel    F/E/N/PPx/Lines  - Maintain euvolemia  - Keep K>4, Mg>2  - DASH diet  - Ambulate as tolerated; getting DAPT as above  - PIV  - Outpatient f/u with cardiology    Ethics/Dispo  - Full code  - Admit to YASMEEN Carbajal M.D.  Pulmonary & Critical Care Medicine  [facility]  Pulmonary and [facility] Medicine at [place]  [address]  [place] [address]  T: [phone]  F: [phone]

## 2022-04-15 NOTE — PROGRESS NOTE ADULT - SUBJECTIVE AND OBJECTIVE BOX
[facility] - [facility], [facility]                                   [address] [place] [address]                                                      PHONE: [phone]                                                         FAX: [phone]  [email or web address]  ---------------------------------------------------------------------------------------------------------------------------------    Overnight events/patient complaints:  Underwent cath and PCI to D1. No chest pain or shortness of breath this morning. Right groin feels fine.    No Known Allergies    MEDICATIONS  (STANDING):  aspirin enteric coated 81 milliGRAM(s) Oral daily  atorvastatin 80 milliGRAM(s) Oral at bedtime  metoprolol tartrate 25 milliGRAM(s) Oral two times a day  ticagrelor 90 milliGRAM(s) Oral two times a day  valsartan 80 milliGRAM(s) Oral daily    MEDICATIONS  (PRN):      Vital Signs Last 24 Hrs  T(C): 36.8 (15 Apr 2022 08:20), Max: 36.9 (15 Apr 2022 05:00)  T(F): 98.2 (15 Apr 2022 08:20), Max: 98.4 (15 Apr 2022 05:00)  HR: 72 (15 Apr 2022 08:20) (52 - 72)  BP: 145/72 (15 Apr 2022 08:20) (127/74 - 182/87)  BP(mean): 88 (15 Apr 2022 08:20) (83 - 100)  RR: 15 (15 Apr 2022 08:20) (14 - 21)  SpO2: 97% (15 Apr 2022 08:20) (96% - 99%)  ICU Vital Signs Last 24 Hrs  RANDI HERNANDEZ  I&O's Detail    14 Apr 2022 07:01  -  15 Apr 2022 07:00  --------------------------------------------------------  IN:  Total IN: 0 mL    OUT:    Voided (mL): 475 mL  Total OUT: 475 mL    Total NET: -475 mL        I&O's Summary    14 Apr 2022 07:01  -  15 Apr 2022 07:00  --------------------------------------------------------  IN: 0 mL / OUT: 475 mL / NET: -475 mL      Drug Dosing Weight  RANDI FRYNATALIARENETTA      PHYSICAL EXAM:  General: Appears well developed, well nourished alert and cooperative.  HEENT: Normocephalic, atraumatic.  Eyes: Pupils reactive, cornea wnl.  Neck: Supple, no nodes adenopathy; no JVD, carotid bruit or thyromegaly.  CARDIOVASCULAR: Regular S1 S2 with no murmur, rub, gallop or lift. Right groin site C/D/I. No hematoma  LUNGS: No rales, rhonchi or wheeze. Normal breath sounds bilaterally.  ABDOMEN: Soft, nontender without mass or organomegaly. bowel sounds normoactive.  EXTREMITIES: No clubbing, cyanosis or edema. Distal pulses wnl.   SKIN: Warm and dry with normal turgor.  NEURO: Alert/oriented x 3/normal motor exam  PSYCH: Appropriate affect        LABS:                        14.5   8.44  )-----------( 199      ( 14 Apr 2022 21:07 )             42.5     04-15    138  |  104  |  17.2  ----------------------------<  101<H>  4.0   |  20.0<L>  |  0.69    Ca    8.7      15 Apr 2022 04:44  Mg     1.9     04-14    TPro  6.3<L>  /  Alb  4.0  /  TBili  0.5  /  DBili  x   /  AST  82<H>  /  ALT  24  /  AlkPhos  63  04-15    RANDI HERNANDEZ  CARDIAC MARKERS ( 15 Apr 2022 04:44 )  x     / 1.29 ng/mL / x     / x     / x      CARDIAC MARKERS ( 14 Apr 2022 21:07 )  x     / 0.02 ng/mL / 138 U/L / x     / x          PT/INR - ( 14 Apr 2022 21:07 )   PT: 11.7 sec;   INR: 1.01 ratio         PTT - ( 14 Apr 2022 21:07 )  PTT:32.7 sec      RADIOLOGY & ADDITIONAL STUDIES:    INTERPRETATION OF TELEMETRY (personally reviewed):  SR, NSVT    ASSESSMENT AND PLAN:  In summary, RANDI HERNANDEZ is an 67y Male with history of tobacco use and family history of CAD presents with acute MI s/p PCI of D1. Follow up work up showed hyperlipidemia and pre-diabetes    - Increase metoprolol tartrate to 50 mg bid  - Continue telemetry monitoring  - Continue aspirin, Brilinta, atorvastatin 80 mg daily  - Awaiting TTE  - Downgrade from ICU to telemetry

## 2022-04-15 NOTE — PROGRESS NOTE ADULT - ASSESSMENT
67 year old male with insignificant pmh coming to hospital with complaints of chest pain. was given nitro aspirin in ed. was found to have stemi and taken to cath lab via Dr Newsome with stent placement. was placed in MICU after for monitoring. during cath was given ticagrelor bolus and infusion.    #STEMI  - w/ elevated trop  - a1c 6.0 on admit   - s/p stent joseph x 1 in diag 2   - interventional cardio consult appreciated  - as patient of Dr Lopez in office consult placed to [facility] Cardiology- d/w Dr Lopez and Dr Handley  - echo pending   - s/p micu   - aspirin and brilinta  - arb, betablocker, statin     #HLD  - staitn     #HTN-Essential  - monitor blood pressure  - metoprolol, valsartan    #DVT Prophylaxis  - venodynes 67 year old male with insignificant pmh coming to hospital with complaints of chest pain. was given nitro aspirin in ed. was found to have stemi and taken to cath lab via Dr Newsome with stent placement. was placed in MICU after for monitoring. during cath was given ticagrelor bolus and infusion.    #STEMI  - w/ elevated trop  - a1c 6.0 on admit   - s/p stent joseph x 1 in diag 2   - interventional cardio consult appreciated  - as patient of Dr Lopez in office consult placed to [facility] Cardiology- d/w Dr Lopez and Dr Handley  - echo pending   - s/p micu   - aspirin and brilinta  - arb, betablocker, statin     #HLD  - staitn     #HTN-Essential  - monitor blood pressure  - metoprolol, valsartan    #DVT Prophylaxis  - venodynes    spoke to patient bedside in pacu states communicating with wife himself does not want me to call at this time; states wife with 5 stents and follows Dr Lopez as well.

## 2022-04-15 NOTE — PROGRESS NOTE ADULT - SUBJECTIVE AND OBJECTIVE BOX
Patient is a 67y old  Male who presents with a chief complaint of acute MI (15 Apr 2022 09:52)    Patient seen and examined at bedside.     ALLERGIES:  No Known Allergies    MEDICATIONS  (STANDING):  aspirin enteric coated 81 milliGRAM(s) Oral daily  atorvastatin 80 milliGRAM(s) Oral at bedtime  metoprolol tartrate 25 milliGRAM(s) Oral two times a day  ticagrelor 90 milliGRAM(s) Oral two times a day  valsartan 80 milliGRAM(s) Oral daily    MEDICATIONS  (PRN):    Vital Signs Last 24 Hrs  T(F): 98.2 (15 Apr 2022 08:20), Max: 98.4 (15 Apr 2022 05:00)  HR: 66 (15 Apr 2022 10:24) (52 - 72)  BP: 126/87 (15 Apr 2022 09:30) (126/87 - 182/87)  RR: 15 (15 Apr 2022 10:24) (14 - 21)  SpO2: 97% (15 Apr 2022 10:24) (96% - 99%)  I&O's Summary    14 Apr 2022 07:01  -  15 Apr 2022 07:00  --------------------------------------------------------  IN: 0 mL / OUT: 475 mL / NET: -475 mL      PHYSICAL EXAM:  General: NAD, A/O x 3  ENT: MMM, no thrush  Neck: Supple, No JVD  Lungs: Clear to auscultation bilaterally, good air entry, non-labored breathing  Cardio: +s1/s2  Abdomen: Soft, Nontender, Nondistended; Bowel sounds present  Extremities: No calf tenderness, No pitting edema    LABS:                        14.5   8.44  )-----------( 199      ( 14 Apr 2022 21:07 )             42.5     04-15    138  |  104  |  17.2  ----------------------------<  101  4.0   |  20.0  |  0.69    Ca    8.7      15 Apr 2022 04:44  Mg     1.9     04-14    TPro  6.3  /  Alb  4.0  /  TBili  0.5  /  DBili  x   /  AST  82  /  ALT  24  /  AlkPhos  63  04-15    PT/INR - ( 14 Apr 2022 21:07 )   PT: 11.7 sec;   INR: 1.01 ratio    PTT - ( 14 Apr 2022 21:07 )  PTT:32.7 sec    CARDIAC MARKERS ( 14 Apr 2022 21:07 )  x     / x     / 138 U/L / x     / x        04-15 Chol 209 mg/dL LDL -- HDL 39 mg/dL Trig 90 mg/dL    COVID-19 PCR: NotDetec (04-14-22 @ 21:08)    RADIOLOGY & ADDITIONAL TESTS:  - no new tests    Care Discussed with Consultants/Other Providers:   MICU   Cardiology

## 2022-04-15 NOTE — PROGRESS NOTE ADULT - NS ATTEND AMEND GEN_ALL_CORE FT
Patient was seen and examined at bedside s/p STEMI with PCI to the AZAEL x 1 to 100% occluded Diag 2 (LYNNE 2.5x26mm), on DAPT, Lipitor, Lopressor and Valsartan   blood pressure controlled   Telemetry reviewed   - TTE done shows: Normal left ventricular internal cavity size, Normal global left ventricular systolic function, Left ventricular ejection fraction, by visual estimation, is 60 to 65%. Normal left atrial size. Sclerotic aortic valve with normal opening. Mildly dilated pulmonary artery. There is no evidence of pericardial effusion.  recommend to monitor for another 24 hrs and then possible discharge home to follow up with Dr. Lopez in the outpatient setting     Jeanette Handley D.O. [facility]  Cardiology/Vascular Cardiology -[facility]iology   Telephone # [phone] Patient was seen and examined at bedside s/p STEMI with PCI to the AZAEL x 1 to 100% occluded Diag 2 (LYNNE 2.5x26mm), on DAPT, Lipitor, Lopressor and Valsartan   blood pressure controlled   Telemetry reviewed 3 beats NSVT   - TTE done shows: Normal left ventricular internal cavity size, Normal global left ventricular systolic function, Left ventricular ejection fraction, by visual estimation, is 60 to 65%. Normal left atrial size. Sclerotic aortic valve with normal opening. Mildly dilated pulmonary artery. There is no evidence of pericardial effusion.  recommend to monitor for another 24 hrs and then possible discharge home to follow up with Dr. Lopez in the outpatient setting     Jeanette Handley D.O. [facility]  Cardiology/Vascular Cardiology -[facility] Cardiology   Telephone # [phone]

## 2022-04-15 NOTE — PROGRESS NOTE ADULT - SUBJECTIVE AND OBJECTIVE BOX
Interventional Cardiology NP note:     -s/p STEMI/PCI last night with Dr. Newsome; procedure via RFA: AZAEL x 1 to 100% occluded Diag 2 (LYNNE 2.5x26mm) (prelim report; official report to follow)  Intraprocedure meds given:  Heparin 9,000 units IV  Cangrelor bolus and infusion x 2 hours  Brilinta 180mg PO  Omnipaque 189 ml  Overnight without complications; patient chest pain free      EKG this am: Pending  TELE: NSR 60s with occ runs of 4-5 beats AIVR, occ PVCs and PACs (reviewed by me)    MEDICATIONS  (STANDING):  aspirin enteric coated 81 milliGRAM(s) Oral daily  atorvastatin 80 milliGRAM(s) Oral at bedtime  metoprolol tartrate 25 milliGRAM(s) Oral two times a day  ticagrelor 90 milliGRAM(s) Oral two times a day  valsartan 80 milliGRAM(s) Oral daily      Allergies:  No Known Allergies      PAST MEDICAL & SURGICAL HISTORY:  History of tobacco use        Vital Signs Last 24 Hrs  T(C): 36.9 (15 Apr 2022 05:00), Max: 36.9 (15 Apr 2022 05:00)  T(F): 98.4 (15 Apr 2022 05:00), Max: 98.4 (15 Apr 2022 05:00)  HR: 54 (15 Apr 2022 07:00) (52 - 70)  BP: 141/69 (15 Apr 2022 07:00) (127/74 - 182/87)  BP(mean): 89 (15 Apr 2022 07:00) (83 - 100)  RR: 15 (15 Apr 2022 07:00) (14 - 21)  SpO2: 99% (15 Apr 2022 07:00) (96% - 99%)    Physical Exam:  Constitutional: NAD, AAOx3  Cardiovascular: +S1S2 RRR  Pulmonary: CTA b/l, unlabored  GI: soft NTND +BS  Extremities: no pedal edema, +distal pulses b/l  Neuro: non focal, HERNÁNDEZ x4  Procedure site: RFA perclose site benign without hematoma/bleeding; no bruit; + right PP    LABS:                        14.5   8.44  )-----------( 199      ( 14 Apr 2022 21:07 )             42.5     04-15    138  |  104  |  17.2  ----------------------------<  101<H>  4.0   |  20.0<L>  |  0.69    Ca    8.7      15 Apr 2022 04:44  Mg     1.9     04-14    TPro  6.3<L>  /  Alb  4.0  /  TBili  0.5  /  DBili  x   /  AST  82<H>  /  ALT  24  /  AlkPhos  63  04-15    PT/INR - ( 14 Apr 2022 21:07 )   PT: 11.7 sec;   INR: 1.01 ratio         PTT - ( 14 Apr 2022 21:07 )  PTT:32.7 sec      RADIOLOGY & ADDITIONAL TESTS:   Interventional Cardiology NP note:     -s/p STEMI/PCI last night with Dr. Newsome; procedure via RFA: AZAEL x 1 to 100% occluded Diag 2 (LYNNE 2.5x26mm) (prelim report; official report to follow)  Intraprocedure meds given:  Heparin 9,000 units IV  Cangrelor bolus and infusion x 2 hours  Brilinta 180mg PO  Omnipaque 189 ml  Overnight without complications; patient chest pain free      EKG this am: NSR 62 bpm Q wave I and aVL without ST elevations/depressions  TELE: NSR 60s with occ runs of 4-5 beats AIVR, occ PVCs and PACs (reviewed by me)    MEDICATIONS  (STANDING):  aspirin enteric coated 81 milliGRAM(s) Oral daily  atorvastatin 80 milliGRAM(s) Oral at bedtime  metoprolol tartrate 25 milliGRAM(s) Oral two times a day  ticagrelor 90 milliGRAM(s) Oral two times a day  valsartan 80 milliGRAM(s) Oral daily      Allergies:  No Known Allergies      PAST MEDICAL & SURGICAL HISTORY:  History of tobacco use        Vital Signs Last 24 Hrs  T(C): 36.9 (15 Apr 2022 05:00), Max: 36.9 (15 Apr 2022 05:00)  T(F): 98.4 (15 Apr 2022 05:00), Max: 98.4 (15 Apr 2022 05:00)  HR: 54 (15 Apr 2022 07:00) (52 - 70)  BP: 141/69 (15 Apr 2022 07:00) (127/74 - 182/87)  BP(mean): 89 (15 Apr 2022 07:00) (83 - 100)  RR: 15 (15 Apr 2022 07:00) (14 - 21)  SpO2: 99% (15 Apr 2022 07:00) (96% - 99%)    Physical Exam:  Constitutional: NAD, AAOx3  Cardiovascular: +S1S2 RRR  Pulmonary: CTA b/l, unlabored  GI: soft NTND +BS  Extremities: no pedal edema, +distal pulses b/l  Neuro: non focal, HERNÁNDEZ x4  Procedure site: RFA perclose site benign without hematoma/bleeding; no bruit; + right PP    LABS:                        14.5   8.44  )-----------( 199      ( 14 Apr 2022 21:07 )             42.5     04-15    138  |  104  |  17.2  ----------------------------<  101<H>  4.0   |  20.0<L>  |  0.69    Ca    8.7      15 Apr 2022 04:44  Mg     1.9     04-14    TPro  6.3<L>  /  Alb  4.0  /  TBili  0.5  /  DBili  x   /  AST  82<H>  /  ALT  24  /  AlkPhos  63  04-15    PT/INR - ( 14 Apr 2022 21:07 )   PT: 11.7 sec;   INR: 1.01 ratio         PTT - ( 14 Apr 2022 21:07 )  PTT:32.7 sec      RADIOLOGY & ADDITIONAL TESTS:                                                            [facility]                                                         CARDIOLOGY AT [place] [id number]                                                         Telephone: [phone]. Fax:[phone]                                                                             PROGRESS NOTE    Reason for follow up: STEMI  Update: s/p STEMI/PCI last night with Dr. Newsome; procedure via RFA: AZAEL x 1 to 100% occluded Diag 2 (LYNNE 2.5x26mm) (prelim report; official report to follow)  Intraprocedure meds given:  Heparin 9,000 units IV  Cangrelor bolus and infusion x 2 hours  Brilinta 180mg PO  Omnipaque 189 ml  Overnight without complications; patient chest pain free      EKG this am: NSR 62 bpm Q wave I and aVL without ST elevations/depressions  TELE: NSR 60s with occ runs of 4-5 beats AIVR, occ PVCs and PACs (reviewed by me)    Review of symptoms:   Cardiac:  No chest pain. No dyspnea. No palpitations.  Respiratory: no cough. No dyspnea  Gastrointestinal: No diarrhea. No abdominal pain. No bleeding.   Neuro: No focal neuro complaints.    MEDICATIONS  (STANDING):  aspirin enteric coated 81 milliGRAM(s) Oral daily  atorvastatin 80 milliGRAM(s) Oral at bedtime  metoprolol tartrate 25 milliGRAM(s) Oral two times a day  ticagrelor 90 milliGRAM(s) Oral two times a day  valsartan 80 milliGRAM(s) Oral daily      Allergies:  No Known Allergies      PAST MEDICAL & SURGICAL HISTORY:  History of tobacco use        Vital Signs Last 24 Hrs  T(C): 36.9 (15 Apr 2022 05:00), Max: 36.9 (15 Apr 2022 05:00)  T(F): 98.4 (15 Apr 2022 05:00), Max: 98.4 (15 Apr 2022 05:00)  HR: 54 (15 Apr 2022 07:00) (52 - 70)  BP: 141/69 (15 Apr 2022 07:00) (127/74 - 182/87)  BP(mean): 89 (15 Apr 2022 07:00) (83 - 100)  RR: 15 (15 Apr 2022 07:00) (14 - 21)  SpO2: 99% (15 Apr 2022 07:00) (96% - 99%)    Physical Exam:  Constitutional: NAD, AAOx3  Cardiovascular: +S1S2 RRR  Pulmonary: CTA b/l, unlabored  GI: soft NTND +BS  Extremities: no pedal edema, +distal pulses b/l  Neuro: non focal, HERNÁNDEZ x4  Procedure site: RFA perclose site benign without hematoma/bleeding; no bruit; + right PP    LABS:                        14.5   8.44  )-----------( 199      ( 14 Apr 2022 21:07 )             42.5     04-15    138  |  104  |  17.2  ----------------------------<  101<H>  4.0   |  20.0<L>  |  0.69    Ca    8.7      15 Apr 2022 04:44  Mg     1.9     04-14    TPro  6.3<L>  /  Alb  4.0  /  TBili  0.5  /  DBili  x   /  AST  82<H>  /  ALT  24  /  AlkPhos  63  04-15    PT/INR - ( 14 Apr 2022 21:07 )   PT: 11.7 sec;   INR: 1.01 ratio         PTT - ( 14 Apr 2022 21:07 )  PTT:32.7 sec      RADIOLOGY & ADDITIONAL TESTS:

## 2022-04-15 NOTE — PROGRESS NOTE ADULT - ASSESSMENT
A/P: 66yo M with PMHx of tobacco use, family CAD presents to [facility] after patient developed acute onset of chest pain.  Patient reports this afternoon he developed vague L sided chest pain with radiation down his left arm, but with rest subsided. The while eating supper, pain returned and it sharp, 9/10 in severity, L sided, with radiation to LUE.  EMA called and patient received 324mg ASA and Nitro SL x2.  In [facility] patient reports pain is down to 1/10 after receiving Nitro and resting comfortable.  Denies associated shortness of breath, nausea, vomiting, dizziness, diaphoresis or HA.  Followed by [facility] Cardiology Dr. Lopez.  ECG in [facility] c/w lateral STEMI.  Code STEMI and Cath transfer.  Now s/p STEMI/PCI last night with Dr. Newsome; procedure via RFA: AZAEL x 1 to 100% occluded Diag 2 (LYNNE 2.5x26mm) (prelim report; official report to follow)  Intraprocedure meds given:  Heparin 9,000 units IV  Cangrelor bolus and infusion x 2 hours  Brilinta 180mg PO  Omnipaque 189ml  Overnight without complications; patient chest pain free.  Patient admitted to MICU (stayed in PACU secondary to lack of available bed)  -Stable for transfer to telemetry bed today from a cardiac perspective (will speak with [facility] Cardiologist prior to transfer)  -Pending TTE today  -Patient unsure if he will follow with [facility] Cardiology or return to Dr. Lopez (1st office visit as per patient was 3 weeks ago for work up); will confirm prior to discharge  -Meds: Maintain DAPT with ASA/Brilinta; high dose statin with atorvastatin 80mg; Beta blocker with metoprolol 25mg BID; ARB with Valsartan 80mg  -Benefits of DAPT emphasized with patient verbal understanding  -Lifestyle mods/diet/activity/meds discussed with patient verbal understanding  -cardiac rehab info provided/referral and communication to cardiac rehab completed  -Will Discuss with Covering [facility] Cardiologist       A/P: 66yo M with PMHx of tobacco use, family CAD presents to [facility] after patient developed acute onset of chest pain.  Patient reports this afternoon he developed vague L sided chest pain with radiation down his left arm, but with rest subsided. The while eating supper, pain returned and it sharp, 9/10 in severity, L sided, with radiation to LUE.  EMA called and patient received 324mg ASA and Nitro SL x2.  In [facility] patient reports pain is down to 1/10 after receiving Nitro and resting comfortable.  Denies associated shortness of breath, nausea, vomiting, dizziness, diaphoresis or HA.  Followed by [facility] Cardiology Dr. Lopez.  ECG in [facility] c/w lateral STEMI.  Code STEMI and Cath transfer.  Now s/p STEMI/PCI last night with Dr. Newsome; procedure via RFA: AZAEL x 1 to 100% occluded Diag 2 (LYNNE 2.5x26mm) (prelim report; official report to follow)  Intraprocedure meds given:  Heparin 9,000 units IV  Cangrelor bolus and infusion x 2 hours  Brilinta 180mg PO  Omnipaque 189ml  Overnight without complications; patient chest pain free.  Patient admitted to MICU (stayed in PACU secondary to lack of available bed)  -Stable for transfer to telemetry bed today from a cardiac perspective   -Pending TTE today  -[facility] to follow (1st office visit with Dr. Lopez as per patient was 3 weeks ago for work up)  -Meds: Maintain DAPT with ASA/Brilinta; high dose statin with atorvastatin 80mg; Beta blocker with metoprolol 25mg BID; ARB with Valsartan 80mg  -Benefits of DAPT emphasized with patient verbal understanding  -Lifestyle mods/diet/activity/meds discussed with patient verbal understanding  -cardiac rehab info provided/referral and communication to cardiac rehab completed  -Discussed with Dr. Handley

## 2022-04-16 ENCOUNTER — TRANSCRIPTION ENCOUNTER (OUTPATIENT)
Age: 67
End: 2022-04-16

## 2022-04-16 VITALS
OXYGEN SATURATION: 96 % | HEART RATE: 110 BPM | DIASTOLIC BLOOD PRESSURE: 80 MMHG | TEMPERATURE: 98 F | SYSTOLIC BLOOD PRESSURE: 143 MMHG | RESPIRATION RATE: 18 BRPM

## 2022-04-16 DIAGNOSIS — I21.3 ST ELEVATION (STEMI) MYOCARDIAL INFARCTION OF UNSPECIFIED SITE: ICD-10-CM

## 2022-04-16 LAB
ALBUMIN SERPL ELPH-MCNC: 3.6 G/DL — SIGNIFICANT CHANGE UP (ref 3.3–5.2)
ALP SERPL-CCNC: 58 U/L — SIGNIFICANT CHANGE UP (ref 40–120)
ALT FLD-CCNC: 22 U/L — SIGNIFICANT CHANGE UP
ANION GAP SERPL CALC-SCNC: 11 MMOL/L — SIGNIFICANT CHANGE UP (ref 5–17)
AST SERPL-CCNC: 49 U/L — HIGH
BILIRUB SERPL-MCNC: 0.6 MG/DL — SIGNIFICANT CHANGE UP (ref 0.4–2)
BUN SERPL-MCNC: 15.6 MG/DL — SIGNIFICANT CHANGE UP (ref 8–20)
CALCIUM SERPL-MCNC: 8.8 MG/DL — SIGNIFICANT CHANGE UP (ref 8.6–10.2)
CHLORIDE SERPL-SCNC: 106 MMOL/L — SIGNIFICANT CHANGE UP (ref 98–107)
CO2 SERPL-SCNC: 22 MMOL/L — SIGNIFICANT CHANGE UP (ref 22–29)
CREAT SERPL-MCNC: 0.69 MG/DL — SIGNIFICANT CHANGE UP (ref 0.5–1.3)
EGFR: 100 ML/MIN/1.73M2 — SIGNIFICANT CHANGE UP
GLUCOSE SERPL-MCNC: 99 MG/DL — SIGNIFICANT CHANGE UP (ref 70–99)
HCT VFR BLD CALC: 42.9 % — SIGNIFICANT CHANGE UP (ref 39–50)
HGB BLD-MCNC: 14.4 G/DL — SIGNIFICANT CHANGE UP (ref 13–17)
MAGNESIUM SERPL-MCNC: 1.8 MG/DL — SIGNIFICANT CHANGE UP (ref 1.6–2.6)
MCHC RBC-ENTMCNC: 32.9 PG — SIGNIFICANT CHANGE UP (ref 27–34)
MCHC RBC-ENTMCNC: 33.6 GM/DL — SIGNIFICANT CHANGE UP (ref 32–36)
MCV RBC AUTO: 97.9 FL — SIGNIFICANT CHANGE UP (ref 80–100)
PHOSPHATE SERPL-MCNC: 2.8 MG/DL — SIGNIFICANT CHANGE UP (ref 2.4–4.7)
PLATELET # BLD AUTO: 190 K/UL — SIGNIFICANT CHANGE UP (ref 150–400)
POTASSIUM SERPL-MCNC: 3.9 MMOL/L — SIGNIFICANT CHANGE UP (ref 3.5–5.3)
POTASSIUM SERPL-SCNC: 3.9 MMOL/L — SIGNIFICANT CHANGE UP (ref 3.5–5.3)
PROT SERPL-MCNC: 5.8 G/DL — LOW (ref 6.6–8.7)
RBC # BLD: 4.38 M/UL — SIGNIFICANT CHANGE UP (ref 4.2–5.8)
RBC # FLD: 13.5 % — SIGNIFICANT CHANGE UP (ref 10.3–14.5)
SODIUM SERPL-SCNC: 139 MMOL/L — SIGNIFICANT CHANGE UP (ref 135–145)
WBC # BLD: 9.75 K/UL — SIGNIFICANT CHANGE UP (ref 3.8–10.5)
WBC # FLD AUTO: 9.75 K/UL — SIGNIFICANT CHANGE UP (ref 3.8–10.5)

## 2022-04-16 PROCEDURE — 99233 SBSQ HOSP IP/OBS HIGH 50: CPT

## 2022-04-16 PROCEDURE — 99239 HOSP IP/OBS DSCHRG MGMT >30: CPT

## 2022-04-16 RX ORDER — METOPROLOL TARTRATE 50 MG
1 TABLET ORAL
Qty: 30 | Refills: 0 | DISCHARGE
Start: 2022-04-16 | End: 2022-05-15

## 2022-04-16 RX ORDER — METOPROLOL TARTRATE 50 MG
1 TABLET ORAL
Qty: 30 | Refills: 0
Start: 2022-04-16 | End: 2022-05-15

## 2022-04-16 RX ORDER — POTASSIUM CHLORIDE 20 MEQ
20 PACKET (EA) ORAL ONCE
Refills: 0 | Status: COMPLETED | OUTPATIENT
Start: 2022-04-16 | End: 2022-04-16

## 2022-04-16 RX ORDER — ASPIRIN/CALCIUM CARB/MAGNESIUM 324 MG
1 TABLET ORAL
Qty: 30 | Refills: 0
Start: 2022-04-16 | End: 2022-05-15

## 2022-04-16 RX ORDER — TICAGRELOR 90 MG/1
1 TABLET ORAL
Qty: 60 | Refills: 3
Start: 2022-04-16 | End: 2022-08-13

## 2022-04-16 RX ORDER — ATORVASTATIN CALCIUM 80 MG/1
1 TABLET, FILM COATED ORAL
Qty: 30 | Refills: 0
Start: 2022-04-16 | End: 2022-05-15

## 2022-04-16 RX ORDER — MAGNESIUM SULFATE 500 MG/ML
2 VIAL (ML) INJECTION ONCE
Refills: 0 | Status: COMPLETED | OUTPATIENT
Start: 2022-04-16 | End: 2022-04-16

## 2022-04-16 RX ORDER — TICAGRELOR 90 MG/1
1 TABLET ORAL
Qty: 60 | Refills: 0
Start: 2022-04-16 | End: 2022-05-15

## 2022-04-16 RX ORDER — VALSARTAN 80 MG/1
1 TABLET ORAL
Qty: 30 | Refills: 0
Start: 2022-04-16 | End: 2022-05-15

## 2022-04-16 RX ADMIN — Medication 25 GRAM(S): at 09:01

## 2022-04-16 RX ADMIN — VALSARTAN 80 MILLIGRAM(S): 80 TABLET ORAL at 05:53

## 2022-04-16 RX ADMIN — Medication 25 MILLIGRAM(S): at 09:01

## 2022-04-16 RX ADMIN — ENOXAPARIN SODIUM 40 MILLIGRAM(S): 100 INJECTION SUBCUTANEOUS at 05:53

## 2022-04-16 RX ADMIN — TICAGRELOR 90 MILLIGRAM(S): 90 TABLET ORAL at 05:53

## 2022-04-16 RX ADMIN — Medication 20 MILLIEQUIVALENT(S): at 09:01

## 2022-04-16 NOTE — DISCHARGE NOTE PROVIDER - HOSPITAL COURSE
67 year old male with insignificant pmh coming to hospital with complaints of chest pain. was given nitro aspirin in ed. Admitted with STEMI s/p PCI of D1. Follow up work up showed hyperlipidemia and pre-diabetes  With intermitted PVCs on tele but no episodes of sustained VT. Metoprolol titrated. TTE without acute findings. No post procedural complications.     Patient is medically stable for discharge with close Cardiology follow up.

## 2022-04-16 NOTE — DISCHARGE NOTE NURSING/CASE MANAGEMENT/SOCIAL WORK - NSDCPEFALRISK_GEN_ALL_CORE
For information on Fall & Injury Prevention, visit: [email or web address] OR  [email or web address] OR  https://www.cdc.gov/steadi/patient.html

## 2022-04-16 NOTE — DISCHARGE NOTE PROVIDER - NSDCMRMEDTOKEN_GEN_ALL_CORE_FT
aspirin 81 mg oral delayed release tablet: 1 tab(s) orally once a day  atorvastatin 80 mg oral tablet: 1 tab(s) orally once a day (at bedtime)  metoprolol succinate 50 mg oral tablet, extended release: 1 tab(s) orally once a day  ticagrelor 90 mg oral tablet: 1 tab(s) orally 2 times a day  valsartan 80 mg oral tablet: 1 tab(s) orally once a day

## 2022-04-16 NOTE — DISCHARGE NOTE NURSING/CASE MANAGEMENT/SOCIAL WORK - PATIENT PORTAL LINK FT
You can access the FollowMyHealth Patient Portal offered by [facility] by registering at the following website: [email or web address]. By joining [employer]’s FollowMyHealth portal, you will also be able to view your health information using other applications (apps) compatible with our system.

## 2022-04-16 NOTE — CHART NOTE - NSCHARTNOTEFT_GEN_A_CORE
Called by RN. Patient with 7beats of VTACH and another 3 beats overnight. Patient now back to baseline rhythm. Patient is asymptomatic, VSS, in no distress. Morning Mag, Phos, and BMP pending.  RN to notify with any acute changes to PA.

## 2022-04-16 NOTE — PROGRESS NOTE ADULT - PROBLEM SELECTOR PLAN 1
- s/p AZAEL x 1 to 100% occluded Diag 2 (LYNNE 2.5x26mm)   - No angina or anginal equivalents   - cardiac rehab info provided/referral and communication to cardiac rehab completed   - Diet/lifestyle modifications and medication compliance heavily reinforced   - smoking cessation reinforced  - Pt with stable vital signs, telemetry stable, physical exam unremarkable and unchanged from pre-procedural baseline, neurovascularly intact, ambulating, eating, review of systems completely negative. pt verbalized an understanding of the discharge teaching. Patient to follow up with Dr. Lopez within 1 week   - continue ASA, brilinta, lipitor, metoprolol, valsartan   - will check if brilinta is covered by insurance  - if brilinta is not covered load w/ 600mg Plavix and start 75mg plavix starting tomorrow.

## 2022-04-16 NOTE — DISCHARGE NOTE PROVIDER - ATTENDING DISCHARGE PHYSICAL EXAMINATION:
VITALS:   T(C): 36.8 (04-16-22 @ 04:48), Max: 36.8 (04-16-22 @ 04:48)  HR: 75 (04-16-22 @ 09:00) (60 - 75)  BP: 117/67 (04-16-22 @ 09:00) (111/73 - 136/83)  RR: 18 (04-16-22 @ 04:48) (18 - 18)  SpO2: 95% (04-16-22 @ 04:48) (95% - 98%)    GENERAL: NAD, lying in bed comfortably  HEAD:  Atraumatic, Normocephalic  EYES: EOMI, PERRLA, conjunctiva and sclera clear  ENT: Moist mucous membranes  NECK: Supple, No JVD  CHEST/LUNG: Clear to auscultation bilaterally; No rales, rhonchi, wheezing, or rubs. Unlabored respirations  HEART: Regular rate and rhythm; No murmurs, rubs, or gallops  ABDOMEN: BSx4; Soft, nontender, nondistended  EXTREMITIES:  2+ Peripheral Pulses, brisk capillary refill. No clubbing, cyanosis, or edema  NERVOUS SYSTEM:  A&Ox3, no focal deficits   SKIN: No rashes or lesions  PSYCH: Normal affect, euthymic mood

## 2022-04-16 NOTE — DISCHARGE NOTE PROVIDER - CARE PROVIDER_API CALL
Jake Lopez)  Cardiology; Internal Medicine  [address] [place] [address], [address] [id number]  Phone: [phone]  Fax: [phone]  Follow Up Time: 2 weeks

## 2022-04-16 NOTE — PROGRESS NOTE ADULT - ASSESSMENT
66yo M with PMHx of tobacco use, family CAD presents to [facility] after patient developed acute onset of chest pain.  Patient reports this afternoon he developed vague L sided chest pain with radiation down his left arm, but with rest subsided. The while eating supper, pain returned and it sharp, 9/10 in severity, L sided, with radiation to LUE.  EMA called and patient received 324mg ASA and Nitro SL x2.  In [facility] patient reports pain is down to 1/10 after receiving Nitro and resting comfortable.  Denies associated shortness of breath, nausea, vomiting, dizziness, diaphoresis or HA.  Followed by [facility] Cardiology Dr. Lopez.  ECG in [facility] c/w lateral STEMI.  Code STEMI and Cath transfer.  Now s/p STEMI/PCI last night with Dr. Newsome; procedure via RFA: AZAEL x 1 to 100% occluded Diag 2 (LYNNE 2.5x26mm)

## 2022-04-16 NOTE — PROGRESS NOTE ADULT - NS ATTEND AMEND GEN_ALL_CORE FT
stemi.  diagonal occlusion s/p PCIO,.   dual antiplatelet therapy . statins.  beta-blocker   No further in-patient cardiac work-up/management is needed.  Follow-up in cardiology office in 2 weeks.

## 2022-04-16 NOTE — DISCHARGE NOTE PROVIDER - NSDCCPCAREPLAN_GEN_ALL_CORE_FT
PRINCIPAL DISCHARGE DIAGNOSIS  Diagnosis: Acute ST elevation myocardial infarction (STEMI)  Assessment and Plan of Treatment:       SECONDARY DISCHARGE DIAGNOSES  Diagnosis: Prediabetes  Assessment and Plan of Treatment:

## 2022-04-16 NOTE — PROGRESS NOTE ADULT - SUBJECTIVE AND OBJECTIVE BOX
[facility]                                                         CARDIOLOGY AT [facility]                                                                  [address], [place] [address]                                                         Telephone: [phone]. Fax:[phone]                                                                             PROGRESS NOTE    Reason for follow up: STEMI   Update: stable for DC       Review of symptoms:   Cardiac:  No chest pain. No dyspnea. No palpitations.  Respiratory: no cough. No dyspnea  Gastrointestinal: No diarrhea. No abdominal pain. No bleeding.   Neuro: No focal neuro complaints.    Vitals:  T(C): 36.8 (04-16-22 @ 04:48), Max: 36.8 (04-16-22 @ 04:48)  HR: 75 (04-16-22 @ 09:00) (60 - 75)  BP: 117/67 (04-16-22 @ 09:00) (111/73 - 132/72)  RR: 18 (04-16-22 @ 04:48) (18 - 18)  SpO2: 95% (04-16-22 @ 04:48) (95% - 98%)  Wt(kg): --  I&O's Summary    15 Apr 2022 07:01  -  16 Apr 2022 07:00  --------------------------------------------------------  IN: 320 mL / OUT: 0 mL / NET: 320 mL      Weight (kg): 77.1 (04-14 @ 21:22)    PHYSICAL EXAM:  Appearance: Comfortable. No acute distress  HEENT:  Atraumatic. Normocephalic.  Normal oral mucosa  Neurologic: A & O x 3, no gross focal deficits.  Cardiovascular: RRR S1 S2, No murmur, no rubs/gallops. No JVD  Respiratory: Lungs clear to auscultation, unlabored   Gastrointestinal:  Soft, Non-tender, + BS  Lower Extremities: 2+ Peripheral Pulses, No clubbing, cyanosis, or edema  Psychiatry: Patient is calm. No agitation.   Skin: warm and dry.    CURRENT CARDIAC MEDICATIONS:  metoprolol tartrate 25 milliGRAM(s) Oral two times a day  valsartan 80 milliGRAM(s) Oral daily      CURRENT OTHER MEDICATIONS:  atorvastatin 80 milliGRAM(s) Oral at bedtime  aspirin enteric coated 81 milliGRAM(s) Oral daily  enoxaparin Injectable 40 milliGRAM(s) SubCutaneous every 24 hours  ticagrelor 90 milliGRAM(s) Oral two times a day      LABS:	 	  ( 15 Apr 2022 04:44 )  Troponin T  1.29<H>,  CPK  X    , CKMB  X    , BNP X        , ( 14 Apr 2022 21:07 )  Troponin T  0.02 ,  CPK  138  , CKMB  X    ,                                 14.4   9.75  )-----------( 190      ( 16 Apr 2022 07:15 )             42.9     04-16    139  |  106  |  15.6  ----------------------------<  99  3.9   |  22.0  |  0.69    Ca    8.8      16 Apr 2022 07:15  Phos  2.8     04-16  Mg     1.8     04-16    TPro  5.8<L>  /  Alb  3.6  /  TBili  0.6  /  DBili  x   /  AST  49<H>  /  ALT  22  /  AlkPhos  58  04-16    PT/INR/PTT ( 14 Apr 2022 21:07 )                       :                       :      11.7         :       32.7                  .        .                   .              .           .       1.01        .                                       Lipid Profile: Date: 04-15 @ 04:44  Total cholesterol 209; Direct LDL: --; HDL: 39; Triglycerides:90

## 2022-05-10 ENCOUNTER — NON-APPOINTMENT (OUTPATIENT)
Age: 67
End: 2022-05-10

## 2022-05-10 ENCOUNTER — APPOINTMENT (OUTPATIENT)
Dept: CARDIOLOGY | Facility: CLINIC | Age: 67
End: 2022-05-10
Payer: MEDICARE

## 2022-05-10 VITALS
SYSTOLIC BLOOD PRESSURE: 139 MMHG | BODY MASS INDEX: 23.7 KG/M2 | OXYGEN SATURATION: 98 % | HEART RATE: 62 BPM | WEIGHT: 175 LBS | TEMPERATURE: 97.8 F | DIASTOLIC BLOOD PRESSURE: 66 MMHG | HEIGHT: 72 IN

## 2022-05-10 VITALS — DIASTOLIC BLOOD PRESSURE: 70 MMHG | SYSTOLIC BLOOD PRESSURE: 142 MMHG

## 2022-05-10 PROCEDURE — 99214 OFFICE O/P EST MOD 30 MIN: CPT

## 2022-05-10 PROCEDURE — 93000 ELECTROCARDIOGRAM COMPLETE: CPT

## 2022-05-10 RX ORDER — MULTIVITAMIN
CAPSULE ORAL
Refills: 0 | Status: ACTIVE | COMMUNITY

## 2022-08-08 PROBLEM — Z87.891 PERSONAL HISTORY OF NICOTINE DEPENDENCE: Chronic | Status: ACTIVE | Noted: 2022-04-14

## 2022-09-06 ENCOUNTER — APPOINTMENT (OUTPATIENT)
Dept: FAMILY MEDICINE | Facility: CLINIC | Age: 67
End: 2022-09-06

## 2022-10-11 ENCOUNTER — APPOINTMENT (OUTPATIENT)
Dept: FAMILY MEDICINE | Facility: CLINIC | Age: 67
End: 2022-10-11

## 2022-10-11 VITALS
BODY MASS INDEX: 24.38 KG/M2 | OXYGEN SATURATION: 98 % | HEIGHT: 72 IN | HEART RATE: 86 BPM | SYSTOLIC BLOOD PRESSURE: 138 MMHG | WEIGHT: 180 LBS | DIASTOLIC BLOOD PRESSURE: 82 MMHG

## 2022-10-11 DIAGNOSIS — R09.81 NASAL CONGESTION: ICD-10-CM

## 2022-10-11 PROCEDURE — G0444 DEPRESSION SCREEN ANNUAL: CPT | Mod: 59

## 2022-10-11 PROCEDURE — 99213 OFFICE O/P EST LOW 20 MIN: CPT | Mod: 25

## 2022-10-11 PROCEDURE — 36415 COLL VENOUS BLD VENIPUNCTURE: CPT

## 2022-10-11 PROCEDURE — G0439: CPT

## 2022-10-12 NOTE — HISTORY OF PRESENT ILLNESS
[FreeTextEntry1] : Annual Wellness Visit  [de-identified] : RANDI HERNANDEZ is a 67 year old male patient presenting to the clinic today for annual wellness visit. Patient reports he had a heart attack in April. He sees a cardiologist and next appointment is on 11/8. Notes his wife told him since his heart attack he is snoring more and has frequent nasal congestion. Has cats at home but notes hardly having nasal congestion before his heart attack.\par \par Patient no longer smoking cigars or cigarettes. He used to smoke one cigar daily.\par \par Patient received Covid-19 vaccines but unsure of date, forgot to bring in card. Declined flu and pneumonia vaccine today. Asked how he can receive shingles vaccine.

## 2022-10-12 NOTE — ASSESSMENT
[FreeTextEntry1] : RANDI HERNANDEZ is a 67 year old male patient presenting to the clinic today for annual wellness visit.\par \par #PE/Vitals\par - stable\par \par #PHQ-2 Behavioral Health Screenin\par #Reviewed Patients Medical History\par \par #Visual Acuity\par - Right Eye 20/20\par - Left Eye 20/20\par - Both Eyes 20/20\par \par #STEMI\par - occurred 22\par - pt reports increased snoring and nasal congestion since heart attack\par \par #CAD\par - managed by cardiologist \par - next clive \par \par #HTN\par - stable with current medication regimen \par \par #HLD\par - medication adjustment may be needed after reviewing lab results \par \par #Former Cigar Smoker\par - pt no longer smoking cigars or cigarettes\par - used to smoke one cigar every day\par \par #Discussed Pneumonia Vaccine\par - once in lifetime vaccine after age 65 \par - strongly advised pt to consider receiving vaccine

## 2022-10-12 NOTE — END OF VISIT
[FreeTextEntry3] : This note was written by Mayela Faulkner on 10/11/2022, acting as a scribe for MD Marquita.\par \par All medic record entries were at my, Dr.Meenu Gustavo MD, direction and personally dictated by me in 10/11/2022. I have personally reviewed the chart and agree that the record accurately reflects my personal performance of the history, physical exam, assessment, and plan.

## 2022-10-18 LAB
ALBUMIN SERPL ELPH-MCNC: 4.7 G/DL
ALP BLD-CCNC: 82 U/L
ALT SERPL-CCNC: 53 U/L
ANION GAP SERPL CALC-SCNC: 11 MMOL/L
APPEARANCE: CLEAR
AST SERPL-CCNC: 32 U/L
BASOPHILS # BLD AUTO: 0.05 K/UL
BASOPHILS NFR BLD AUTO: 0.8 %
BILIRUB SERPL-MCNC: 0.5 MG/DL
BILIRUBIN URINE: NEGATIVE
BLOOD URINE: NEGATIVE
BUN SERPL-MCNC: 18 MG/DL
CALCIUM SERPL-MCNC: 9.6 MG/DL
CHLORIDE SERPL-SCNC: 107 MMOL/L
CHOLEST SERPL-MCNC: 162 MG/DL
CO2 SERPL-SCNC: 24 MMOL/L
COLOR: NORMAL
CREAT SERPL-MCNC: 0.91 MG/DL
EGFR: 91 ML/MIN/1.73M2
EOSINOPHIL # BLD AUTO: 0.22 K/UL
EOSINOPHIL NFR BLD AUTO: 3.4 %
ESTIMATED AVERAGE GLUCOSE: 128 MG/DL
FOLATE SERPL-MCNC: >20 NG/ML
GLUCOSE QUALITATIVE U: NEGATIVE
GLUCOSE SERPL-MCNC: 92 MG/DL
HBA1C MFR BLD HPLC: 6.1 %
HCT VFR BLD CALC: 41.1 %
HDLC SERPL-MCNC: 44 MG/DL
HGB BLD-MCNC: 13.4 G/DL
IMM GRANULOCYTES NFR BLD AUTO: 0.5 %
KETONES URINE: NEGATIVE
LDLC SERPL CALC-MCNC: 104 MG/DL
LEUKOCYTE ESTERASE URINE: NEGATIVE
LYMPHOCYTES # BLD AUTO: 1.45 K/UL
LYMPHOCYTES NFR BLD AUTO: 22.4 %
MAN DIFF?: NORMAL
MCHC RBC-ENTMCNC: 32.6 GM/DL
MCHC RBC-ENTMCNC: 32.8 PG
MCV RBC AUTO: 100.5 FL
MONOCYTES # BLD AUTO: 0.65 K/UL
MONOCYTES NFR BLD AUTO: 10.1 %
NEUTROPHILS # BLD AUTO: 4.06 K/UL
NEUTROPHILS NFR BLD AUTO: 62.8 %
NITRITE URINE: NEGATIVE
NONHDLC SERPL-MCNC: 118 MG/DL
PH URINE: 6
PLATELET # BLD AUTO: 212 K/UL
POTASSIUM SERPL-SCNC: 5.2 MMOL/L
PROT SERPL-MCNC: 7 G/DL
PROTEIN URINE: NEGATIVE
RBC # BLD: 4.09 M/UL
RBC # FLD: 14.2 %
SODIUM SERPL-SCNC: 141 MMOL/L
SPECIFIC GRAVITY URINE: 1.01
TRIGL SERPL-MCNC: 71 MG/DL
TSH SERPL-ACNC: 3.27 UIU/ML
UROBILINOGEN URINE: NORMAL
VIT B12 SERPL-MCNC: 818 PG/ML
WBC # FLD AUTO: 6.46 K/UL

## 2022-10-25 NOTE — HEALTH RISK ASSESSMENT
[Good] : ~his/her~  mood as  good [No] : No [No falls in past year] : Patient reported no falls in the past year [PHQ-2 Negative - No further assessment needed] : PHQ-2 Negative - No further assessment needed [PHQ-9 Negative - No further assessment needed] : PHQ-9 Negative - No further assessment needed [Former] : Former [[id number]] : 0 current weight

## 2022-11-04 RX ORDER — COLD-HOT PACK
EACH MISCELLANEOUS DAILY
Refills: 0 | Status: ACTIVE | COMMUNITY

## 2022-11-08 ENCOUNTER — APPOINTMENT (OUTPATIENT)
Dept: CARDIOLOGY | Facility: CLINIC | Age: 67
End: 2022-11-08

## 2022-11-08 ENCOUNTER — NON-APPOINTMENT (OUTPATIENT)
Age: 67
End: 2022-11-08

## 2022-11-08 VITALS
OXYGEN SATURATION: 95 % | HEART RATE: 63 BPM | BODY MASS INDEX: 24.38 KG/M2 | HEIGHT: 72 IN | DIASTOLIC BLOOD PRESSURE: 74 MMHG | WEIGHT: 180 LBS | TEMPERATURE: 97.8 F | SYSTOLIC BLOOD PRESSURE: 142 MMHG

## 2022-11-08 PROCEDURE — 99215 OFFICE O/P EST HI 40 MIN: CPT

## 2022-11-08 PROCEDURE — 93000 ELECTROCARDIOGRAM COMPLETE: CPT

## 2022-11-08 RX ORDER — FLUTICASONE PROPIONATE 50 UG/1
50 SPRAY, METERED NASAL
Qty: 1 | Refills: 2 | Status: DISCONTINUED | COMMUNITY
Start: 2022-10-11 | End: 2022-11-08

## 2022-11-08 NOTE — CARDIOLOGY SUMMARY
[de-identified] : 11 8 2022\par \par 3 15 2022 Sinus  Rhythm \par WITHIN NORMAL LIMITS\par  [de-identified] : Aprr:  lateral STEMI, LHC revealed 100% occluded Diag 2 with DESx1 (LYNNE 2.5x26mm).

## 2022-11-08 NOTE — HISTORY OF PRESENT ILLNESS
[FreeTextEntry1] : family history coronary artery disease \par \par .\par HPI for today: : he has sniffles for long time.  no chest pain . \par no dyspnea.   dizziness. when he bends and get up.  \par no syncope. no palp]itaitons\par he had an MI in apr 2022: s/p PCi.  on Dual antiplatelet therapy . statins. \par \par old note: This is a 66 year male with  dyslipidemia , here for coronary artery disease evaluation and elevated blood pressure without diagnosis of hypertension \par  he is here for coronary artery disease evaluation.   last time he checked his heart was 5 years ago. \par no dizziness. no syncope. no palpitaitons. no dyspnea on exertion . no chest pain . no skipped heart beats.\par family history : \par Mother: CABg and PACemaker\par father: cancer. \par Smoking: half a cigar a day.   Smoking cigarettes . a pack a day. quit smoking 15 years ago.  he smoked a pack a day for 30 years.  as

## 2022-11-08 NOTE — DISCUSSION/SUMMARY
[Patient] : the patient [Risks] : risks [Benefits] : benefits [Alternatives] : alternatives [With Me] : with me [___ Month(s)] : in [unfilled] month(s) [FreeTextEntry1] : This is a 67 year male with  dyslipidemia , here for coronary artery disease evaluation and elevated blood pressure without diagnosis of hypertension \par \par \par 1) hypertension :  on beta blocker and ARb. \par 2) coronary artery disease: s/p PCi : apr 2022: Dual antiplatelet therapy . statins.  reduce lipitor to 40 mg.  will stop brillinta in apr 2023. \par 3) prior smoking.: Extensive > 30 pack year history of smoiking \par  4) screening for Peripheral vascular disease : SHU test.  [EKG obtained to assist in diagnosis and management of assessed problem(s)] : EKG obtained to assist in diagnosis and management of assessed problem(s)

## 2022-11-17 ENCOUNTER — APPOINTMENT (OUTPATIENT)
Dept: CARDIOLOGY | Facility: CLINIC | Age: 67
End: 2022-11-17

## 2022-11-17 PROCEDURE — 93923 UPR/LXTR ART STDY 3+ LVLS: CPT

## 2022-11-22 ENCOUNTER — TRANSCRIPTION ENCOUNTER (OUTPATIENT)
Age: 67
End: 2022-11-22

## 2023-04-11 ENCOUNTER — APPOINTMENT (OUTPATIENT)
Dept: CARDIOLOGY | Facility: CLINIC | Age: 68
End: 2023-04-11
Payer: MEDICARE

## 2023-04-11 ENCOUNTER — NON-APPOINTMENT (OUTPATIENT)
Age: 68
End: 2023-04-11

## 2023-04-11 VITALS
SYSTOLIC BLOOD PRESSURE: 126 MMHG | TEMPERATURE: 97.6 F | OXYGEN SATURATION: 97 % | DIASTOLIC BLOOD PRESSURE: 70 MMHG | WEIGHT: 181 LBS | HEIGHT: 72 IN | BODY MASS INDEX: 24.52 KG/M2 | HEART RATE: 69 BPM

## 2023-04-11 PROCEDURE — 99215 OFFICE O/P EST HI 40 MIN: CPT

## 2023-04-11 PROCEDURE — 93000 ELECTROCARDIOGRAM COMPLETE: CPT

## 2023-07-25 ENCOUNTER — APPOINTMENT (OUTPATIENT)
Dept: CARDIOLOGY | Facility: CLINIC | Age: 68
End: 2023-07-25
Payer: MEDICARE

## 2023-07-25 DIAGNOSIS — Z86.39 PERSONAL HISTORY OF OTHER ENDOCRINE, NUTRITIONAL AND METABOLIC DISEASE: ICD-10-CM

## 2023-07-25 PROCEDURE — 93880 EXTRACRANIAL BILAT STUDY: CPT

## 2023-07-26 ENCOUNTER — NON-APPOINTMENT (OUTPATIENT)
Age: 68
End: 2023-07-26

## 2023-07-26 PROBLEM — Z86.39 HISTORY OF HYPERKALEMIA: Status: RESOLVED | Noted: 2023-07-26 | Resolved: 2023-07-26

## 2023-07-26 LAB
ALBUMIN SERPL ELPH-MCNC: 4.7 G/DL
ALP BLD-CCNC: 63 U/L
ALT SERPL-CCNC: 35 U/L
ANION GAP SERPL CALC-SCNC: 12 MMOL/L
APO LP(A) SERPL-MCNC: 199.8 NMOL/L
AST SERPL-CCNC: 22 U/L
BILIRUB SERPL-MCNC: 0.5 MG/DL
BUN SERPL-MCNC: 19 MG/DL
CALCIUM SERPL-MCNC: 9.5 MG/DL
CHLORIDE SERPL-SCNC: 105 MMOL/L
CHOLEST SERPL-MCNC: 150 MG/DL
CO2 SERPL-SCNC: 23 MMOL/L
CREAT SERPL-MCNC: 1 MG/DL
EGFR: 81 ML/MIN/1.73M2
ESTIMATED AVERAGE GLUCOSE: 128 MG/DL
GLUCOSE SERPL-MCNC: 113 MG/DL
HBA1C MFR BLD HPLC: 6.1 %
HDLC SERPL-MCNC: 45 MG/DL
LDLC SERPL CALC-MCNC: 90 MG/DL
NONHDLC SERPL-MCNC: 105 MG/DL
POTASSIUM SERPL-SCNC: 5.4 MMOL/L
PROT SERPL-MCNC: 6.7 G/DL
SODIUM SERPL-SCNC: 140 MMOL/L
TRIGL SERPL-MCNC: 77 MG/DL

## 2023-08-30 LAB
ANION GAP SERPL CALC-SCNC: 10 MMOL/L
BUN SERPL-MCNC: 16 MG/DL
CALCIUM SERPL-MCNC: 9.7 MG/DL
CHLORIDE SERPL-SCNC: 105 MMOL/L
CO2 SERPL-SCNC: 27 MMOL/L
CREAT SERPL-MCNC: 0.96 MG/DL
EGFR: 85 ML/MIN/1.73M2
GLUCOSE SERPL-MCNC: 122 MG/DL
POTASSIUM SERPL-SCNC: 5 MMOL/L
SODIUM SERPL-SCNC: 143 MMOL/L

## 2023-10-02 NOTE — ED ADULT TRIAGE NOTE - NS ED TRIAGE AVPU SCALE
[address] [place] [address] physician to physician conversation was not required for facilitation of this transfer. PROCEDURES:  Unless otherwise noted below, none     Procedures    FINAL IMPRESSION      1.  Depression with suicidal ideation          DISPOSITION/PLAN   DISPOSITION  Transfer      (Please note that portions of this note were completed with a voice recognition program.  Efforts were made to edit thedictations but occasionally words are mis-transcribed.)    Eva Hernandez MD (electronically signed)  Attending Emergency Physician          Eva Hernandez MD  10/02/23 0001       Eva Hernandez MD  10/02/23 [id number]
Alert-The patient is alert, awake and responds to voice. The patient is oriented to time, place, and person. The triage nurse is able to obtain subjective information.

## 2023-10-17 ENCOUNTER — APPOINTMENT (OUTPATIENT)
Dept: FAMILY MEDICINE | Facility: CLINIC | Age: 68
End: 2023-10-17
Payer: MEDICARE

## 2023-10-17 ENCOUNTER — NON-APPOINTMENT (OUTPATIENT)
Age: 68
End: 2023-10-17

## 2023-10-17 VITALS
HEIGHT: 72 IN | SYSTOLIC BLOOD PRESSURE: 120 MMHG | BODY MASS INDEX: 24.38 KG/M2 | OXYGEN SATURATION: 98 % | DIASTOLIC BLOOD PRESSURE: 72 MMHG | WEIGHT: 180 LBS | HEART RATE: 76 BPM

## 2023-10-17 DIAGNOSIS — Z23 ENCOUNTER FOR IMMUNIZATION: ICD-10-CM

## 2023-10-17 DIAGNOSIS — B02.9 ZOSTER W/OUT COMPLICATIONS: ICD-10-CM

## 2023-10-17 PROCEDURE — 99214 OFFICE O/P EST MOD 30 MIN: CPT | Mod: 25

## 2023-10-17 PROCEDURE — G0439: CPT

## 2023-10-17 PROCEDURE — 36415 COLL VENOUS BLD VENIPUNCTURE: CPT

## 2023-10-18 LAB
ALBUMIN SERPL ELPH-MCNC: 4.6 G/DL
ALP BLD-CCNC: 69 U/L
ALT SERPL-CCNC: 65 U/L
ANION GAP SERPL CALC-SCNC: 10 MMOL/L
APPEARANCE: CLEAR
AST SERPL-CCNC: 31 U/L
BILIRUB SERPL-MCNC: 0.5 MG/DL
BILIRUBIN URINE: NEGATIVE
BLOOD URINE: NEGATIVE
BUN SERPL-MCNC: 21 MG/DL
CALCIUM SERPL-MCNC: 10 MG/DL
CHLORIDE SERPL-SCNC: 106 MMOL/L
CHOLEST SERPL-MCNC: 110 MG/DL
CO2 SERPL-SCNC: 25 MMOL/L
COLOR: YELLOW
CREAT SERPL-MCNC: 0.94 MG/DL
EGFR: 87 ML/MIN/1.73M2
ESTIMATED AVERAGE GLUCOSE: 126 MG/DL
GLUCOSE QUALITATIVE U: NEGATIVE MG/DL
GLUCOSE SERPL-MCNC: 108 MG/DL
HBA1C MFR BLD HPLC: 6 %
HDLC SERPL-MCNC: 33 MG/DL
KETONES URINE: NEGATIVE MG/DL
LDLC SERPL CALC-MCNC: 62 MG/DL
LEUKOCYTE ESTERASE URINE: NEGATIVE
NITRITE URINE: NEGATIVE
NONHDLC SERPL-MCNC: 76 MG/DL
PH URINE: 6
POTASSIUM SERPL-SCNC: 5.4 MMOL/L
PROT SERPL-MCNC: 6.9 G/DL
PROTEIN URINE: NEGATIVE MG/DL
PSA FREE FLD-MCNC: 13 %
PSA FREE SERPL-MCNC: 0.54 NG/ML
PSA SERPL-MCNC: 4.14 NG/ML
SODIUM SERPL-SCNC: 142 MMOL/L
SPECIFIC GRAVITY URINE: 1.01
TRIGL SERPL-MCNC: 65 MG/DL
TSH SERPL-ACNC: 2.26 UIU/ML
UROBILINOGEN URINE: 0.2 MG/DL

## 2023-10-23 DIAGNOSIS — H91.90 UNSPECIFIED HEARING LOSS, UNSPECIFIED EAR: ICD-10-CM

## 2023-10-24 LAB
BASOPHILS # BLD AUTO: 0.04 K/UL
BASOPHILS NFR BLD AUTO: 0.6 %
EOSINOPHIL # BLD AUTO: 0.12 K/UL
EOSINOPHIL NFR BLD AUTO: 1.8 %
HCT VFR BLD CALC: 41.7 %
HGB BLD-MCNC: 13 G/DL
IMM GRANULOCYTES NFR BLD AUTO: 0.3 %
LYMPHOCYTES # BLD AUTO: 1.67 K/UL
LYMPHOCYTES NFR BLD AUTO: 25.7 %
MAN DIFF?: NORMAL
MCHC RBC-ENTMCNC: 31.2 GM/DL
MCHC RBC-ENTMCNC: 32.1 PG
MCV RBC AUTO: 103 FL
MONOCYTES # BLD AUTO: 0.76 K/UL
MONOCYTES NFR BLD AUTO: 11.7 %
NEUTROPHILS # BLD AUTO: 3.89 K/UL
NEUTROPHILS NFR BLD AUTO: 59.9 %
PLATELET # BLD AUTO: 224 K/UL
RBC # BLD: 4.05 M/UL
RBC # FLD: 13.5 %
WBC # FLD AUTO: 6.5 K/UL

## 2023-10-25 VITALS — BODY MASS INDEX: 24.38 KG/M2 | HEIGHT: 72 IN | WEIGHT: 180 LBS

## 2023-10-25 DIAGNOSIS — Z95.5 PRESENCE OF CORONARY ANGIOPLASTY IMPLANT AND GRAFT: ICD-10-CM

## 2023-10-25 DIAGNOSIS — Z83.3 FAMILY HISTORY OF DIABETES MELLITUS: ICD-10-CM

## 2023-10-27 ENCOUNTER — APPOINTMENT (OUTPATIENT)
Dept: OTOLARYNGOLOGY | Facility: CLINIC | Age: 68
End: 2023-10-27
Payer: MEDICARE

## 2023-10-27 VITALS — BODY MASS INDEX: 24.38 KG/M2 | WEIGHT: 180 LBS | HEIGHT: 72 IN

## 2023-10-27 DIAGNOSIS — H91.93 UNSPECIFIED HEARING LOSS, BILATERAL: ICD-10-CM

## 2023-10-27 PROCEDURE — 92557 COMPREHENSIVE HEARING TEST: CPT

## 2023-10-27 PROCEDURE — 99203 OFFICE O/P NEW LOW 30 MIN: CPT | Mod: 25

## 2023-10-27 PROCEDURE — G0268 REMOVAL OF IMPACTED WAX MD: CPT

## 2023-10-27 PROCEDURE — 92567 TYMPANOMETRY: CPT

## 2023-10-31 ENCOUNTER — TRANSCRIPTION ENCOUNTER (OUTPATIENT)
Age: 68
End: 2023-10-31

## 2023-10-31 ENCOUNTER — APPOINTMENT (OUTPATIENT)
Dept: UROLOGY | Facility: CLINIC | Age: 68
End: 2023-10-31
Payer: MEDICARE

## 2023-10-31 VITALS
BODY MASS INDEX: 24.38 KG/M2 | RESPIRATION RATE: 16 BRPM | HEIGHT: 72 IN | SYSTOLIC BLOOD PRESSURE: 131 MMHG | DIASTOLIC BLOOD PRESSURE: 77 MMHG | OXYGEN SATURATION: 98 % | HEART RATE: 66 BPM | WEIGHT: 180 LBS

## 2023-10-31 PROCEDURE — 99203 OFFICE O/P NEW LOW 30 MIN: CPT

## 2023-10-31 RX ORDER — METOPROLOL SUCCINATE 50 MG/1
50 TABLET, EXTENDED RELEASE ORAL DAILY
Qty: 90 | Refills: 3 | Status: ACTIVE | COMMUNITY
Start: 2022-04-20 | End: 1900-01-01

## 2023-10-31 RX ORDER — EZETIMIBE 10 MG/1
10 TABLET ORAL DAILY
Qty: 90 | Refills: 3 | Status: ACTIVE | COMMUNITY
Start: 2023-07-26 | End: 1900-01-01

## 2023-10-31 RX ORDER — ATORVASTATIN CALCIUM 40 MG/1
40 TABLET, FILM COATED ORAL
Qty: 90 | Refills: 3 | Status: ACTIVE | COMMUNITY
Start: 2022-04-20 | End: 1900-01-01

## 2023-11-02 LAB
PSA FREE FLD-MCNC: 13 %
PSA FREE SERPL-MCNC: 0.57 NG/ML
PSA SERPL-MCNC: 4.25 NG/ML

## 2023-11-06 ENCOUNTER — TRANSCRIPTION ENCOUNTER (OUTPATIENT)
Age: 68
End: 2023-11-06

## 2023-11-07 ENCOUNTER — APPOINTMENT (OUTPATIENT)
Dept: CARDIOLOGY | Facility: CLINIC | Age: 68
End: 2023-11-07
Payer: MEDICARE

## 2023-11-07 VITALS
TEMPERATURE: 97.9 F | HEIGHT: 72 IN | WEIGHT: 182 LBS | DIASTOLIC BLOOD PRESSURE: 72 MMHG | OXYGEN SATURATION: 98 % | SYSTOLIC BLOOD PRESSURE: 116 MMHG | BODY MASS INDEX: 24.65 KG/M2 | HEART RATE: 57 BPM

## 2023-11-07 DIAGNOSIS — Z00.00 ENCOUNTER FOR GENERAL ADULT MEDICAL EXAMINATION W/OUT ABNORMAL FINDINGS: ICD-10-CM

## 2023-11-07 DIAGNOSIS — R03.0 ELEVATED BLOOD-PRESSURE READING, W/OUT DIAGNOSIS OF HYPERTENSION: ICD-10-CM

## 2023-11-07 PROCEDURE — 99215 OFFICE O/P EST HI 40 MIN: CPT

## 2023-11-07 PROCEDURE — 93000 ELECTROCARDIOGRAM COMPLETE: CPT

## 2023-11-07 RX ORDER — TICAGRELOR 90 MG/1
90 TABLET ORAL
Qty: 180 | Refills: 3 | Status: DISCONTINUED | COMMUNITY
Start: 2022-04-20 | End: 2023-11-07

## 2023-11-10 ENCOUNTER — APPOINTMENT (OUTPATIENT)
Dept: FAMILY MEDICINE | Facility: CLINIC | Age: 68
End: 2023-11-10

## 2023-11-16 ENCOUNTER — OUTPATIENT (OUTPATIENT)
Dept: OUTPATIENT SERVICES | Facility: HOSPITAL | Age: 68
LOS: 1 days | End: 2023-11-16
Payer: MEDICARE

## 2023-11-16 ENCOUNTER — RESULT REVIEW (OUTPATIENT)
Age: 68
End: 2023-11-16

## 2023-11-16 ENCOUNTER — APPOINTMENT (OUTPATIENT)
Dept: MRI IMAGING | Facility: CLINIC | Age: 68
End: 2023-11-16
Payer: MEDICARE

## 2023-11-16 DIAGNOSIS — R97.20 ELEVATED PROSTATE SPECIFIC ANTIGEN [PSA]: ICD-10-CM

## 2023-11-16 PROCEDURE — 76498 UNLISTED MR PROCEDURE: CPT

## 2023-11-16 PROCEDURE — A9585: CPT

## 2023-11-16 PROCEDURE — 72197 MRI PELVIS W/O & W/DYE: CPT | Mod: 26,MH

## 2023-11-16 PROCEDURE — 76498P: CUSTOM | Mod: 26,MH

## 2023-11-16 PROCEDURE — 72197 MRI PELVIS W/O & W/DYE: CPT

## 2023-12-02 ENCOUNTER — OUTPATIENT (OUTPATIENT)
Dept: OUTPATIENT SERVICES | Facility: HOSPITAL | Age: 68
LOS: 1 days | End: 2023-12-02
Payer: MEDICARE

## 2023-12-02 DIAGNOSIS — Z00.8 ENCOUNTER FOR OTHER GENERAL EXAMINATION: ICD-10-CM

## 2023-12-06 ENCOUNTER — APPOINTMENT (OUTPATIENT)
Dept: UROLOGY | Facility: CLINIC | Age: 68
End: 2023-12-06

## 2023-12-21 PROCEDURE — C8001: CPT

## 2023-12-21 PROCEDURE — 76377 3D RENDER W/INTRP POSTPROCES: CPT | Mod: 26

## 2023-12-22 ENCOUNTER — NON-APPOINTMENT (OUTPATIENT)
Age: 68
End: 2023-12-22

## 2023-12-27 ENCOUNTER — APPOINTMENT (OUTPATIENT)
Dept: UROLOGY | Facility: CLINIC | Age: 68
End: 2023-12-27
Payer: MEDICARE

## 2023-12-27 VITALS
BODY MASS INDEX: 24.65 KG/M2 | OXYGEN SATURATION: 98 % | RESPIRATION RATE: 15 BRPM | WEIGHT: 182 LBS | HEART RATE: 60 BPM | HEIGHT: 72 IN | SYSTOLIC BLOOD PRESSURE: 130 MMHG | DIASTOLIC BLOOD PRESSURE: 74 MMHG

## 2023-12-27 PROCEDURE — 76999F: CUSTOM

## 2023-12-27 PROCEDURE — 55700: CPT

## 2023-12-29 ENCOUNTER — NON-APPOINTMENT (OUTPATIENT)
Age: 68
End: 2023-12-29

## 2024-01-03 ENCOUNTER — TRANSCRIPTION ENCOUNTER (OUTPATIENT)
Age: 69
End: 2024-01-03

## 2024-01-03 ENCOUNTER — NON-APPOINTMENT (OUTPATIENT)
Age: 69
End: 2024-01-03

## 2024-01-05 ENCOUNTER — TRANSCRIPTION ENCOUNTER (OUTPATIENT)
Age: 69
End: 2024-01-05

## 2024-01-05 ENCOUNTER — APPOINTMENT (OUTPATIENT)
Dept: FAMILY MEDICINE | Facility: CLINIC | Age: 69
End: 2024-01-05
Payer: MEDICARE

## 2024-01-05 VITALS
OXYGEN SATURATION: 99 % | HEART RATE: 54 BPM | BODY MASS INDEX: 25.06 KG/M2 | DIASTOLIC BLOOD PRESSURE: 74 MMHG | WEIGHT: 185 LBS | SYSTOLIC BLOOD PRESSURE: 130 MMHG | HEIGHT: 72 IN

## 2024-01-05 DIAGNOSIS — R97.20 ELEVATED PROSTATE, SPECIFIC ANTIGEN [PSA]: ICD-10-CM

## 2024-01-05 DIAGNOSIS — Z87.891 PERSONAL HISTORY OF NICOTINE DEPENDENCE: ICD-10-CM

## 2024-01-05 PROCEDURE — 99214 OFFICE O/P EST MOD 30 MIN: CPT

## 2024-01-05 NOTE — HEALTH RISK ASSESSMENT
[Good] : ~his/her~  mood as  good [No] : No [No falls in past year] : Patient reported no falls in the past year [PHQ-2 Negative - No further assessment needed] : PHQ-2 Negative - No further assessment needed [PHQ-9 Negative - No further assessment needed] : PHQ-9 Negative - No further assessment needed [[id number]] : 0

## 2024-01-05 NOTE — PLAN
[FreeTextEntry1] : # fasting blood work done in office  # Patient declined all vaccines for pneumonia and influenza # Prostate biopsy positive for adenocarcinoma # Former smoker we will order a CT chest # Advised patient to do a cardio exercises for low HDL.

## 2024-01-05 NOTE — HISTORY OF PRESENT ILLNESS
[FreeTextEntry1] : follow up HTN [de-identified] : 01/2024- here for follow up. just got results for prostate biopsy and he is going to get a scan will be ordered by urologist.  Patient goes back and forth to [place]. declined all vaccines quit smoking 2 years ago.  Patient used to smoke cigarettes and then he moved to [facility] and after having heart attack he quit completely which is about 2 years ago,  old note-RANDI HERNANDEZ is a 67 year old male patient presenting to the clinic today for annual wellness visit. Patient reports he had a heart attack in April. He sees a cardiologist and next appointment is on 11/8. Notes his wife told him since his heart attack he is snoring more and has frequent nasal congestion. Has cats at home but notes hardly having nasal congestion before his heart attack.  Patient no longer smoking cigars or cigarettes. He used to smoke one cigar daily.  Patient received Covid-19 vaccines but unsure of date, forgot to bring in card. Declined flu and pneumonia vaccine today. Asked how he can receive shingles vaccine.

## 2024-01-08 ENCOUNTER — APPOINTMENT (OUTPATIENT)
Dept: UROLOGY | Facility: CLINIC | Age: 69
End: 2024-01-08
Payer: MEDICARE

## 2024-01-08 LAB
ALBUMIN SERPL ELPH-MCNC: 4.6 G/DL
ALP BLD-CCNC: 60 U/L
ALT SERPL-CCNC: 52 U/L
ANION GAP SERPL CALC-SCNC: 10 MMOL/L
AST SERPL-CCNC: 32 U/L
BASOPHILS # BLD AUTO: 0.04 K/UL
BASOPHILS NFR BLD AUTO: 0.7 %
BILIRUB SERPL-MCNC: 0.4 MG/DL
BUN SERPL-MCNC: 23 MG/DL
CALCIUM SERPL-MCNC: 9.4 MG/DL
CHLORIDE SERPL-SCNC: 104 MMOL/L
CHOLEST SERPL-MCNC: 124 MG/DL
CO2 SERPL-SCNC: 26 MMOL/L
CREAT SERPL-MCNC: 1.01 MG/DL
EGFR: 80 ML/MIN/1.73M2
EOSINOPHIL # BLD AUTO: 0.18 K/UL
EOSINOPHIL NFR BLD AUTO: 3.1 %
ESTIMATED AVERAGE GLUCOSE: 123 MG/DL
GLUCOSE SERPL-MCNC: 116 MG/DL
HBA1C MFR BLD HPLC: 5.9 %
HCT VFR BLD CALC: 42 %
HDLC SERPL-MCNC: 38 MG/DL
HGB BLD-MCNC: 13.6 G/DL
IMM GRANULOCYTES NFR BLD AUTO: 0.3 %
LDLC SERPL CALC-MCNC: 76 MG/DL
LYMPHOCYTES # BLD AUTO: 1.44 K/UL
LYMPHOCYTES NFR BLD AUTO: 25 %
MAN DIFF?: NORMAL
MCHC RBC-ENTMCNC: 32.4 GM/DL
MCHC RBC-ENTMCNC: 33.3 PG
MCV RBC AUTO: 102.7 FL
MONOCYTES # BLD AUTO: 0.63 K/UL
MONOCYTES NFR BLD AUTO: 10.9 %
NEUTROPHILS # BLD AUTO: 3.45 K/UL
NEUTROPHILS NFR BLD AUTO: 60 %
NONHDLC SERPL-MCNC: 87 MG/DL
PLATELET # BLD AUTO: 184 K/UL
POTASSIUM SERPL-SCNC: 5.1 MMOL/L
PROT SERPL-MCNC: 6.8 G/DL
RBC # BLD: 4.09 M/UL
RBC # FLD: 13.2 %
SODIUM SERPL-SCNC: 140 MMOL/L
TRIGL SERPL-MCNC: 47 MG/DL
WBC # FLD AUTO: 5.76 K/UL

## 2024-01-08 PROCEDURE — 99214 OFFICE O/P EST MOD 30 MIN: CPT

## 2024-01-08 NOTE — ASSESSMENT
[FreeTextEntry1] : 68 M here today to discuss 12/27 prostate biopsy results  We discussed all treatment options for treatment of localized prostate cancer.  These included active surveillance, radiation therapy, IMRT and Cyberknife and radical prostatectomy  We discussed the risks, benefits and complications of radiation therapy with hormonal therapy. We discussed the option of robotic radical prostatectomy and the advantages and disadvantages. We discussed the risks, benefits and alternatives and complications specifically impotence and incontinence. We discussed the procedure, in hospital stay and the recovery and expectations. We discussed my experience with this procedure and my outcomes. We also discussed a single port versus multi port robotic approach.  We discussed the cancer outcomes of each options as well.  He will need a PET PSMA scan to evaluate for distant disease  He is leaning towards surgery. HIs surgical consent was signed today.

## 2024-01-08 NOTE — HISTORY OF PRESENT ILLNESS
[FreeTextEntry1] : 68M here to discuss prostate biopsy results 12/27/23  has Adair 4+3 disease PSA 4.3 prostate 35cc  PMH: CAD w/ 1 stent on ASA 81 PSH: hernia repair x3 with mesh

## 2024-01-09 ENCOUNTER — TRANSCRIPTION ENCOUNTER (OUTPATIENT)
Age: 69
End: 2024-01-09

## 2024-01-11 LAB — CORE LAB BIOPSY: NORMAL

## 2024-01-12 ENCOUNTER — TRANSCRIPTION ENCOUNTER (OUTPATIENT)
Age: 69
End: 2024-01-12

## 2024-01-23 ENCOUNTER — APPOINTMENT (OUTPATIENT)
Dept: NUCLEAR MEDICINE | Facility: CLINIC | Age: 69
End: 2024-01-23
Payer: MEDICARE

## 2024-01-23 ENCOUNTER — OUTPATIENT (OUTPATIENT)
Dept: OUTPATIENT SERVICES | Facility: HOSPITAL | Age: 69
LOS: 1 days | End: 2024-01-23

## 2024-01-23 DIAGNOSIS — C61 MALIGNANT NEOPLASM OF PROSTATE: ICD-10-CM

## 2024-01-23 PROCEDURE — 78816 PET IMAGE W/CT FULL BODY: CPT | Mod: 26

## 2024-02-12 ENCOUNTER — OUTPATIENT (OUTPATIENT)
Dept: OUTPATIENT SERVICES | Facility: HOSPITAL | Age: 69
LOS: 1 days | End: 2024-02-12
Payer: MEDICARE

## 2024-02-12 ENCOUNTER — TRANSCRIPTION ENCOUNTER (OUTPATIENT)
Age: 69
End: 2024-02-12

## 2024-02-12 ENCOUNTER — RESULT REVIEW (OUTPATIENT)
Age: 69
End: 2024-02-12

## 2024-02-12 VITALS
RESPIRATION RATE: 16 BRPM | HEIGHT: 72 IN | WEIGHT: 179.9 LBS | SYSTOLIC BLOOD PRESSURE: 131 MMHG | DIASTOLIC BLOOD PRESSURE: 69 MMHG | HEART RATE: 57 BPM | TEMPERATURE: 98 F | OXYGEN SATURATION: 99 %

## 2024-02-12 DIAGNOSIS — Z98.890 OTHER SPECIFIED POSTPROCEDURAL STATES: Chronic | ICD-10-CM

## 2024-02-12 DIAGNOSIS — C61 MALIGNANT NEOPLASM OF PROSTATE: ICD-10-CM

## 2024-02-12 DIAGNOSIS — Z01.818 ENCOUNTER FOR OTHER PREPROCEDURAL EXAMINATION: ICD-10-CM

## 2024-02-12 LAB
A1C WITH ESTIMATED AVERAGE GLUCOSE RESULT: 6.2 % — HIGH (ref 4–5.6)
APPEARANCE UR: CLEAR — SIGNIFICANT CHANGE UP
APTT BLD: 30.7 SEC — SIGNIFICANT CHANGE UP (ref 24.5–35.6)
BACTERIA # UR AUTO: NEGATIVE /HPF — SIGNIFICANT CHANGE UP
BASOPHILS # BLD AUTO: 0.04 K/UL — SIGNIFICANT CHANGE UP (ref 0–0.2)
BASOPHILS NFR BLD AUTO: 0.6 % — SIGNIFICANT CHANGE UP (ref 0–2)
BILIRUB UR-MCNC: NEGATIVE — SIGNIFICANT CHANGE UP
BLD GP AB SCN SERPL QL: SIGNIFICANT CHANGE UP
BUN SERPL-MCNC: 22 MG/DL — SIGNIFICANT CHANGE UP (ref 7–23)
CALCIUM SERPL-MCNC: 9.3 MG/DL — SIGNIFICANT CHANGE UP (ref 8.5–10.1)
CAST: 0 /LPF — SIGNIFICANT CHANGE UP (ref 0–4)
CHLORIDE SERPL-SCNC: 113 MMOL/L — HIGH (ref 96–108)
CO2 SERPL-SCNC: 29 MMOL/L — SIGNIFICANT CHANGE UP (ref 22–31)
COLOR SPEC: YELLOW — SIGNIFICANT CHANGE UP
CREAT SERPL-MCNC: 1.01 MG/DL — SIGNIFICANT CHANGE UP (ref 0.5–1.3)
DIFF PNL FLD: NEGATIVE — SIGNIFICANT CHANGE UP
EGFR: 80 ML/MIN/1.73M2 — SIGNIFICANT CHANGE UP
EOSINOPHIL # BLD AUTO: 0.21 K/UL — SIGNIFICANT CHANGE UP (ref 0–0.5)
EOSINOPHIL NFR BLD AUTO: 2.9 % — SIGNIFICANT CHANGE UP (ref 0–6)
ESTIMATED AVERAGE GLUCOSE: 131 MG/DL — HIGH (ref 68–114)
GLUCOSE SERPL-MCNC: 114 MG/DL — HIGH (ref 70–99)
GLUCOSE UR QL: NEGATIVE MG/DL — SIGNIFICANT CHANGE UP
HCT VFR BLD CALC: 39.6 % — SIGNIFICANT CHANGE UP (ref 39–50)
HGB BLD-MCNC: 12.9 G/DL — LOW (ref 13–17)
IMM GRANULOCYTES NFR BLD AUTO: 0.4 % — SIGNIFICANT CHANGE UP (ref 0–0.9)
INR BLD: 0.95 RATIO — SIGNIFICANT CHANGE UP (ref 0.85–1.18)
KETONES UR-MCNC: NEGATIVE MG/DL — SIGNIFICANT CHANGE UP
LEUKOCYTE ESTERASE UR-ACNC: ABNORMAL
LYMPHOCYTES # BLD AUTO: 1.58 K/UL — SIGNIFICANT CHANGE UP (ref 1–3.3)
LYMPHOCYTES # BLD AUTO: 21.9 % — SIGNIFICANT CHANGE UP (ref 13–44)
MCHC RBC-ENTMCNC: 32.4 PG — SIGNIFICANT CHANGE UP (ref 27–34)
MCHC RBC-ENTMCNC: 32.6 GM/DL — SIGNIFICANT CHANGE UP (ref 32–36)
MCV RBC AUTO: 99.5 FL — SIGNIFICANT CHANGE UP (ref 80–100)
MONOCYTES # BLD AUTO: 0.73 K/UL — SIGNIFICANT CHANGE UP (ref 0–0.9)
MONOCYTES NFR BLD AUTO: 10.1 % — SIGNIFICANT CHANGE UP (ref 2–14)
NEUTROPHILS # BLD AUTO: 4.62 K/UL — SIGNIFICANT CHANGE UP (ref 1.8–7.4)
NEUTROPHILS NFR BLD AUTO: 64.1 % — SIGNIFICANT CHANGE UP (ref 43–77)
NITRITE UR-MCNC: NEGATIVE — SIGNIFICANT CHANGE UP
PH UR: 6 — SIGNIFICANT CHANGE UP (ref 5–8)
PLATELET # BLD AUTO: 251 K/UL — SIGNIFICANT CHANGE UP (ref 150–400)
POTASSIUM SERPL-MCNC: 4.5 MMOL/L — SIGNIFICANT CHANGE UP (ref 3.5–5.3)
POTASSIUM SERPL-SCNC: 4.5 MMOL/L — SIGNIFICANT CHANGE UP (ref 3.5–5.3)
PROT UR-MCNC: NEGATIVE MG/DL — SIGNIFICANT CHANGE UP
PROTHROM AB SERPL-ACNC: 10.7 SEC — SIGNIFICANT CHANGE UP (ref 9.5–13)
RBC # BLD: 3.98 M/UL — LOW (ref 4.2–5.8)
RBC # FLD: 12.4 % — SIGNIFICANT CHANGE UP (ref 10.3–14.5)
RBC CASTS # UR COMP ASSIST: 0 /HPF — SIGNIFICANT CHANGE UP (ref 0–4)
SODIUM SERPL-SCNC: 141 MMOL/L — SIGNIFICANT CHANGE UP (ref 135–145)
SP GR SPEC: 1 — SIGNIFICANT CHANGE UP (ref 1–1.03)
SQUAMOUS # UR AUTO: 0 /HPF — SIGNIFICANT CHANGE UP (ref 0–5)
UROBILINOGEN FLD QL: 0.2 MG/DL — SIGNIFICANT CHANGE UP (ref 0.2–1)
WBC # BLD: 7.21 K/UL — SIGNIFICANT CHANGE UP (ref 3.8–10.5)
WBC # FLD AUTO: 7.21 K/UL — SIGNIFICANT CHANGE UP (ref 3.8–10.5)
WBC UR QL: 2 /HPF — SIGNIFICANT CHANGE UP (ref 0–5)

## 2024-02-12 PROCEDURE — 86850 RBC ANTIBODY SCREEN: CPT

## 2024-02-12 PROCEDURE — 85025 COMPLETE CBC W/AUTO DIFF WBC: CPT

## 2024-02-12 PROCEDURE — 87086 URINE CULTURE/COLONY COUNT: CPT

## 2024-02-12 PROCEDURE — 83036 HEMOGLOBIN GLYCOSYLATED A1C: CPT

## 2024-02-12 PROCEDURE — 81001 URINALYSIS AUTO W/SCOPE: CPT

## 2024-02-12 PROCEDURE — 93010 ELECTROCARDIOGRAM REPORT: CPT

## 2024-02-12 PROCEDURE — 71046 X-RAY EXAM CHEST 2 VIEWS: CPT

## 2024-02-12 PROCEDURE — 93005 ELECTROCARDIOGRAM TRACING: CPT

## 2024-02-12 PROCEDURE — 71046 X-RAY EXAM CHEST 2 VIEWS: CPT | Mod: 26

## 2024-02-12 PROCEDURE — 99214 OFFICE O/P EST MOD 30 MIN: CPT | Mod: 25

## 2024-02-12 PROCEDURE — 36415 COLL VENOUS BLD VENIPUNCTURE: CPT

## 2024-02-12 PROCEDURE — 86901 BLOOD TYPING SEROLOGIC RH(D): CPT

## 2024-02-12 PROCEDURE — 85730 THROMBOPLASTIN TIME PARTIAL: CPT

## 2024-02-12 PROCEDURE — 86900 BLOOD TYPING SEROLOGIC ABO: CPT

## 2024-02-12 PROCEDURE — 80048 BASIC METABOLIC PNL TOTAL CA: CPT

## 2024-02-12 PROCEDURE — 85610 PROTHROMBIN TIME: CPT

## 2024-02-12 NOTE — H&P PST ADULT - NSANTHOSAYNRD_GEN_A_CORE
No. ISIDRA screening performed.  STOP BANG Legend: 0-2 = LOW Risk; 3-4 = INTERMEDIATE Risk; 5-8 = HIGH Risk

## 2024-02-12 NOTE — H&P PST ADULT - CARDIOVASCULAR
regular rate and rhythm/S1 S2 present/no murmur details… regular rate and rhythm/S1 S2 present/no murmur/vascular

## 2024-02-12 NOTE — H&P PST ADULT - NSICDXPASTSURGICALHX_GEN_ALL_CORE_FT
PAST SURGICAL HISTORY:  H/O prostate biopsy     H/O umbilical hernia repair     History of cardiac cath     S/P bilateral inguinal hernia repair

## 2024-02-12 NOTE — H&P PST ADULT - NSICDXPASTMEDICALHX_GEN_ALL_CORE_FT
PAST MEDICAL HISTORY:  CAD (coronary artery disease)     Colon polyp     History of ST elevation myocardial infarction (STEMI)     History of tobacco use     HLD (hyperlipidemia)     HTN (hypertension)     Prediabetes     Prostate cancer     Stented coronary artery

## 2024-02-12 NOTE — H&P PST ADULT - ASSESSMENT
Plan:  1. PST instructions given ; NPO status/  instructions to be given by ASU   2. Pt instructed to take following meds on day of surgery:   3. Pt instructed to take routine evening medications unless indicated   4. Stop NSAIDS ( Aspirin Alev Motrin Mobic Diclofenac), herbal supplements , MVI , Vitamin fish oil 7 days prior to surgery  unless   directed by surgeon or cardiologist;   5. Medical Optimization  with    6. EZ wash instructions given & mupirocin instructions given  7. Labs EKG CXR as per surgeon request   8. Pt denies covid symptoms shortness of breath fever cough      68 year old male with prostate cancer; Pt said he had elevated PSA during routine blood work; prostate biopsy, MRI prostate and PET scan done; denies dysuria or hematuria; he presents to PST for planned robotic radical prostatectomy         Plan:  1. PST instructions given ; NPO status/  instructions to be given by ASU   2. Pt instructed to take following meds on day of surgery: valsartan   3. Pt instructed to take routine evening medications unless indicated   4. Stop NSAIDS ( Aspirin Alev Motrin Mobic Diclofenac), herbal supplements , MVI , Vitamin fish oil 7 days prior to surgery  unless   directed by surgeon or cardiologist;   5. Medical Optimization  with Dr Downs and cardio Dr Lopez   6. EZ wash instructions given   7. Labs EKG CXR as per surgeon request   8. Pt denies covid symptoms shortness of breath fever cough     CAPRINI VTE 2.0 SCORE [CLOT updated 2019]    AGE RELATED RISK FACTORS                                                       MOBILITY RELATED FACTORS  [ ] Age 41-60 years                                            (1 Point)                    [ ] Bed rest                                                        (1 Point)  [x] Age: 61-74 years                                           (2 Points)                  [ ] Plaster cast                                                   (2 Points)  [ ] Age= 75 years                                              (3 Points)                    [ ] Bed bound for more than 72 hours                 (2 Points)    DISEASE RELATED RISK FACTORS                                               GENDER SPECIFIC FACTORS  [ ] Edema in the lower extremities                       (1 Point)              [ ] Pregnancy                                                     (1 Point)  [ ] Varicose veins                                               (1 Point)                     [ ] Post-partum < 6 weeks                                   (1 Point)             [ ] BMI > 25 Kg/m2                                            (1 Point)                     [ ] Hormonal therapy  or oral contraception          (1 Point)                 [ ] Sepsis (in the previous month)                        (1 Point)               [ ] History of pregnancy complications                 (1 point)  [ ] Pneumonia or serious lung disease                                               [ ] Unexplained or recurrent                     (1 Point)           (in the previous month)                               (1 Point)  [ ] Abnormal pulmonary function test                     (1 Point)                 SURGERY RELATED RISK FACTORS  [ ] Acute myocardial infarction                              (1 Point)               [ ]  Section                                             (1 Point)  [ ] Congestive heart failure (in the previous month)  (1 Point)      [ ] Minor surgery                                                  (1 Point)   [ ] Inflammatory bowel disease                             (1 Point)               [ ] Arthroscopic surgery                                        (2 Points)  [ ] Central venous access                                      (2 Points)                [x] General surgery lasting more than 45 minutes (2 points)  [x] Malignancy- Present or previous                   (2 Points)                [ ] Elective arthroplasty                                         (5 points)    [ ] Stroke (in the previous month)                          (5 Points)                                                                                                                                                           HEMATOLOGY RELATED FACTORS                                                 TRAUMA RELATED RISK FACTORS  [ ] Prior episodes of VTE                                     (3 Points)                [ ] Fracture of the hip, pelvis, or leg                       (5 Points)  [ ] Positive family history for VTE                         (3 Points)             [ ] Acute spinal cord injury (in the previous month)  (5 Points)  [ ] Prothrombin 95417 A                                     (3 Points)               [ ] Paralysis  (less than 1 month)                             (5 Points)  [ ] Factor V Leiden                                             (3 Points)                  [ ] Multiple Trauma within 1 month                        (5 Points)  [ ] Lupus anticoagulants                                     (3 Points)                                                           [ ] Anticardiolipin antibodies                               (3 Points)                                                       [ ] High homocysteine in the blood                      (3 Points)                                             [ ] Other congenital or acquired thrombophilia      (3 Points)                                                [ ] Heparin induced thrombocytopenia                  (3 Points)                                     Total Score [     6    ]    The Caprini score indicates that this patient is at high risk for a VTE event (score 6 or greater). Surgical patients in this group will benefit from both pharmacologic prophylaxis and intermittent compression devices.  The surgical team will determine the balance between VTE risk and bleeding risk, and other clinical considerations

## 2024-02-12 NOTE — H&P PST ADULT - HISTORY OF PRESENT ILLNESS
68 year old male  68 year old male with prostate cancer; Pt said he had elevated PSA during routine blood work; prostate biopsy, MRI prostate and PET scan done; denies dysuria or hematuria; he presents to PST for planned robotic radical prostatectomy

## 2024-02-13 DIAGNOSIS — C61 MALIGNANT NEOPLASM OF PROSTATE: ICD-10-CM

## 2024-02-13 DIAGNOSIS — Z01.818 ENCOUNTER FOR OTHER PREPROCEDURAL EXAMINATION: ICD-10-CM

## 2024-02-13 LAB
CULTURE RESULTS: NO GROWTH — SIGNIFICANT CHANGE UP
SPECIMEN SOURCE: SIGNIFICANT CHANGE UP

## 2024-02-14 ENCOUNTER — NON-APPOINTMENT (OUTPATIENT)
Age: 69
End: 2024-02-14

## 2024-02-14 ENCOUNTER — APPOINTMENT (OUTPATIENT)
Dept: FAMILY MEDICINE | Facility: CLINIC | Age: 69
End: 2024-02-14
Payer: MEDICARE

## 2024-02-14 ENCOUNTER — APPOINTMENT (OUTPATIENT)
Dept: CARDIOLOGY | Facility: CLINIC | Age: 69
End: 2024-02-14
Payer: MEDICARE

## 2024-02-14 VITALS
DIASTOLIC BLOOD PRESSURE: 60 MMHG | HEIGHT: 72 IN | WEIGHT: 184 LBS | BODY MASS INDEX: 24.92 KG/M2 | HEART RATE: 61 BPM | OXYGEN SATURATION: 98 % | SYSTOLIC BLOOD PRESSURE: 112 MMHG

## 2024-02-14 VITALS — HEART RATE: 61 BPM | OXYGEN SATURATION: 98 % | DIASTOLIC BLOOD PRESSURE: 60 MMHG | SYSTOLIC BLOOD PRESSURE: 100 MMHG

## 2024-02-14 VITALS — HEIGHT: 72 IN | WEIGHT: 183 LBS | BODY MASS INDEX: 24.79 KG/M2

## 2024-02-14 DIAGNOSIS — Z01.810 ENCOUNTER FOR PREPROCEDURAL CARDIOVASCULAR EXAMINATION: ICD-10-CM

## 2024-02-14 DIAGNOSIS — I21.29 ST ELEVATION (STEMI) MYOCARDIAL INFARCTION INVOLVING OTHER SITES: ICD-10-CM

## 2024-02-14 DIAGNOSIS — E78.5 HYPERLIPIDEMIA, UNSPECIFIED: ICD-10-CM

## 2024-02-14 DIAGNOSIS — I25.10 ATHEROSCLEROTIC HEART DISEASE OF NATIVE CORONARY ARTERY W/OUT ANGINA PECTORIS: ICD-10-CM

## 2024-02-14 DIAGNOSIS — I10 ESSENTIAL (PRIMARY) HYPERTENSION: ICD-10-CM

## 2024-02-14 DIAGNOSIS — Z01.818 ENCOUNTER FOR OTHER PREPROCEDURAL EXAMINATION: ICD-10-CM

## 2024-02-14 PROCEDURE — 93000 ELECTROCARDIOGRAM COMPLETE: CPT

## 2024-02-14 PROCEDURE — 99214 OFFICE O/P EST MOD 30 MIN: CPT

## 2024-02-20 PROBLEM — Z01.818 PREOPERATIVE CLEARANCE: Status: ACTIVE | Noted: 2024-02-20

## 2024-02-20 NOTE — ASSESSMENT
[Patient Optimized for Surgery] : Patient optimized for surgery [No Further Testing Recommended] : no further testing recommended [As per surgery] : as per surgery [FreeTextEntry4] : At this time, I see no absolute contraindication for proposed procedure. I reviewed and advised no aspirin, NSAIDs, fish oil vitamin E one week prior to procedure. Best wishes offered.  see cardio clearance about keeping aspirin on

## 2024-02-20 NOTE — HISTORY OF PRESENT ILLNESS
[Aortic Stenosis] : no aortic stenosis [Atrial Fibrillation] : no atrial fibrillation [Coronary Artery Disease] : coronary artery disease [Recent Myocardial Infarction] : no recent myocardial infarction [Implantable Device/Pacemaker] : no implantable device/pacemaker [No Pertinent Pulmonary History] : no history of asthma, COPD, sleep apnea, or smoking [No Adverse Anesthesia Reaction] : no adverse anesthesia reaction in self or family member [Chronic Anticoagulation] : no chronic anticoagulation [Chronic Kidney Disease] : no chronic kidney disease [Diabetes] : no diabetes [(Patient denies any chest pain, claudication, dyspnea on exertion, orthopnea, palpitations or syncope)] : Patient denies any chest pain, claudication, dyspnea on exertion, orthopnea, palpitations or syncope [FreeTextEntry1] : prostactomy [FreeTextEntry2] : 2/22/24 [FreeTextEntry4] :  Pre-operative Medical  assessment prior to robotic radical prostatectomy at  on 2/22/2024 with Dr Aburto - recommended hold ASA if possible Pt reports feeling well, no cardiac complaints Denies chest pain, arm pain, claudication, SOB/HEARD, PND, orthopnea, LE edema, palpitations, lightheadedness, syncope or any bleeding episodes very active at work, 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

## 2024-02-22 ENCOUNTER — OUTPATIENT (OUTPATIENT)
Dept: INPATIENT UNIT | Facility: HOSPITAL | Age: 69
LOS: 1 days | Discharge: HOME | End: 2024-02-22
Payer: MEDICARE

## 2024-02-22 ENCOUNTER — RESULT REVIEW (OUTPATIENT)
Age: 69
End: 2024-02-22

## 2024-02-22 ENCOUNTER — APPOINTMENT (OUTPATIENT)
Dept: UROLOGY | Facility: HOSPITAL | Age: 69
End: 2024-02-22

## 2024-02-22 VITALS
HEIGHT: 72 IN | TEMPERATURE: 98 F | SYSTOLIC BLOOD PRESSURE: 132 MMHG | DIASTOLIC BLOOD PRESSURE: 73 MMHG | WEIGHT: 182.98 LBS | OXYGEN SATURATION: 98 % | RESPIRATION RATE: 16 BRPM | HEART RATE: 60 BPM

## 2024-02-22 DIAGNOSIS — C61 MALIGNANT NEOPLASM OF PROSTATE: ICD-10-CM

## 2024-02-22 DIAGNOSIS — Z98.890 OTHER SPECIFIED POSTPROCEDURAL STATES: Chronic | ICD-10-CM

## 2024-02-22 DIAGNOSIS — I25.10 ATHEROSCLEROTIC HEART DISEASE OF NATIVE CORONARY ARTERY WITHOUT ANGINA PECTORIS: ICD-10-CM

## 2024-02-22 DIAGNOSIS — I10 ESSENTIAL (PRIMARY) HYPERTENSION: ICD-10-CM

## 2024-02-22 LAB
ANION GAP SERPL CALC-SCNC: 1 MMOL/L — LOW (ref 5–17)
BASOPHILS # BLD AUTO: 0.03 K/UL — SIGNIFICANT CHANGE UP (ref 0–0.2)
BASOPHILS NFR BLD AUTO: 0.2 % — SIGNIFICANT CHANGE UP (ref 0–2)
BUN SERPL-MCNC: 20 MG/DL — SIGNIFICANT CHANGE UP (ref 7–23)
CALCIUM SERPL-MCNC: 8.4 MG/DL — LOW (ref 8.5–10.1)
CHLORIDE SERPL-SCNC: 113 MMOL/L — HIGH (ref 96–108)
CO2 SERPL-SCNC: 28 MMOL/L — SIGNIFICANT CHANGE UP (ref 22–31)
CREAT SERPL-MCNC: 1.03 MG/DL — SIGNIFICANT CHANGE UP (ref 0.5–1.3)
EGFR: 78 ML/MIN/1.73M2 — SIGNIFICANT CHANGE UP
EOSINOPHIL # BLD AUTO: 0.01 K/UL — SIGNIFICANT CHANGE UP (ref 0–0.5)
EOSINOPHIL NFR BLD AUTO: 0.1 % — SIGNIFICANT CHANGE UP (ref 0–6)
GLUCOSE SERPL-MCNC: 145 MG/DL — HIGH (ref 70–99)
HCT VFR BLD CALC: 34.8 % — LOW (ref 39–50)
HGB BLD-MCNC: 11.5 G/DL — LOW (ref 13–17)
IMM GRANULOCYTES NFR BLD AUTO: 0.3 % — SIGNIFICANT CHANGE UP (ref 0–0.9)
LYMPHOCYTES # BLD AUTO: 0.5 K/UL — LOW (ref 1–3.3)
LYMPHOCYTES # BLD AUTO: 4 % — LOW (ref 13–44)
MCHC RBC-ENTMCNC: 33 GM/DL — SIGNIFICANT CHANGE UP (ref 32–36)
MCHC RBC-ENTMCNC: 33.3 PG — SIGNIFICANT CHANGE UP (ref 27–34)
MCV RBC AUTO: 100.9 FL — HIGH (ref 80–100)
MONOCYTES # BLD AUTO: 0.17 K/UL — SIGNIFICANT CHANGE UP (ref 0–0.9)
MONOCYTES NFR BLD AUTO: 1.3 % — LOW (ref 2–14)
NEUTROPHILS # BLD AUTO: 11.88 K/UL — HIGH (ref 1.8–7.4)
NEUTROPHILS NFR BLD AUTO: 94.1 % — HIGH (ref 43–77)
PLATELET # BLD AUTO: 183 K/UL — SIGNIFICANT CHANGE UP (ref 150–400)
POTASSIUM SERPL-MCNC: 4.2 MMOL/L — SIGNIFICANT CHANGE UP (ref 3.5–5.3)
POTASSIUM SERPL-SCNC: 4.2 MMOL/L — SIGNIFICANT CHANGE UP (ref 3.5–5.3)
RBC # BLD: 3.45 M/UL — LOW (ref 4.2–5.8)
RBC # FLD: 13 % — SIGNIFICANT CHANGE UP (ref 10.3–14.5)
SODIUM SERPL-SCNC: 142 MMOL/L — SIGNIFICANT CHANGE UP (ref 135–145)
WBC # BLD: 12.63 K/UL — HIGH (ref 3.8–10.5)
WBC # FLD AUTO: 12.63 K/UL — HIGH (ref 3.8–10.5)

## 2024-02-22 PROCEDURE — 80048 BASIC METABOLIC PNL TOTAL CA: CPT

## 2024-02-22 PROCEDURE — 88309 TISSUE EXAM BY PATHOLOGIST: CPT | Mod: 26

## 2024-02-22 PROCEDURE — 55866 LAPS SURG PRST8ECT RPBIC RAD: CPT

## 2024-02-22 PROCEDURE — 38571 LAPAROSCOPY LYMPHADENECTOMY: CPT

## 2024-02-22 PROCEDURE — S2900: CPT

## 2024-02-22 PROCEDURE — 88307 TISSUE EXAM BY PATHOLOGIST: CPT

## 2024-02-22 PROCEDURE — 85025 COMPLETE CBC W/AUTO DIFF WBC: CPT

## 2024-02-22 PROCEDURE — 38571 LAPAROSCOPY LYMPHADENECTOMY: CPT | Mod: AS

## 2024-02-22 PROCEDURE — C1889: CPT

## 2024-02-22 PROCEDURE — 99222 1ST HOSP IP/OBS MODERATE 55: CPT

## 2024-02-22 PROCEDURE — 88307 TISSUE EXAM BY PATHOLOGIST: CPT | Mod: 26

## 2024-02-22 PROCEDURE — 88309 TISSUE EXAM BY PATHOLOGIST: CPT

## 2024-02-22 PROCEDURE — C9399: CPT

## 2024-02-22 PROCEDURE — 36415 COLL VENOUS BLD VENIPUNCTURE: CPT

## 2024-02-22 PROCEDURE — 55866 LAPS SURG PRST8ECT RPBIC RAD: CPT | Mod: AS

## 2024-02-22 RX ORDER — ATORVASTATIN CALCIUM 80 MG/1
40 TABLET, FILM COATED ORAL AT BEDTIME
Refills: 0 | Status: DISCONTINUED | OUTPATIENT
Start: 2024-02-22 | End: 2024-02-23

## 2024-02-22 RX ORDER — OXYCODONE HYDROCHLORIDE 5 MG/1
5 TABLET ORAL ONCE
Refills: 0 | Status: DISCONTINUED | OUTPATIENT
Start: 2024-02-22 | End: 2024-02-22

## 2024-02-22 RX ORDER — ATORVASTATIN CALCIUM 80 MG/1
1 TABLET, FILM COATED ORAL
Refills: 0 | DISCHARGE

## 2024-02-22 RX ORDER — VALSARTAN 80 MG/1
160 TABLET ORAL DAILY
Refills: 0 | Status: DISCONTINUED | OUTPATIENT
Start: 2024-02-22 | End: 2024-02-23

## 2024-02-22 RX ORDER — VALSARTAN 80 MG/1
1 TABLET ORAL
Refills: 0 | DISCHARGE

## 2024-02-22 RX ORDER — SODIUM CHLORIDE 9 MG/ML
500 INJECTION, SOLUTION INTRAVENOUS ONCE
Refills: 0 | Status: COMPLETED | OUTPATIENT
Start: 2024-02-22 | End: 2024-02-22

## 2024-02-22 RX ORDER — SODIUM CHLORIDE 9 MG/ML
1000 INJECTION INTRAMUSCULAR; INTRAVENOUS; SUBCUTANEOUS
Refills: 0 | Status: DISCONTINUED | OUTPATIENT
Start: 2024-02-22 | End: 2024-02-23

## 2024-02-22 RX ORDER — CEFAZOLIN SODIUM 1 G
2000 VIAL (EA) INJECTION EVERY 8 HOURS
Refills: 0 | Status: COMPLETED | OUTPATIENT
Start: 2024-02-22 | End: 2024-02-22

## 2024-02-22 RX ORDER — HYDROMORPHONE HYDROCHLORIDE 2 MG/ML
0.5 INJECTION INTRAMUSCULAR; INTRAVENOUS; SUBCUTANEOUS
Refills: 0 | Status: DISCONTINUED | OUTPATIENT
Start: 2024-02-22 | End: 2024-02-22

## 2024-02-22 RX ORDER — METOPROLOL TARTRATE 50 MG
50 TABLET ORAL AT BEDTIME
Refills: 0 | Status: DISCONTINUED | OUTPATIENT
Start: 2024-02-22 | End: 2024-02-23

## 2024-02-22 RX ORDER — ERGOCALCIFEROL 1.25 MG/1
0 CAPSULE ORAL
Refills: 0 | DISCHARGE

## 2024-02-22 RX ORDER — HEPARIN SODIUM 5000 [USP'U]/ML
5000 INJECTION INTRAVENOUS; SUBCUTANEOUS EVERY 8 HOURS
Refills: 0 | Status: DISCONTINUED | OUTPATIENT
Start: 2024-02-22 | End: 2024-02-23

## 2024-02-22 RX ORDER — ASCORBIC ACID 60 MG
0 TABLET,CHEWABLE ORAL
Refills: 0 | DISCHARGE

## 2024-02-22 RX ORDER — VALSARTAN 80 MG/1
0 TABLET ORAL
Refills: 0 | DISCHARGE

## 2024-02-22 RX ORDER — ONDANSETRON 8 MG/1
4 TABLET, FILM COATED ORAL EVERY 6 HOURS
Refills: 0 | Status: DISCONTINUED | OUTPATIENT
Start: 2024-02-22 | End: 2024-02-23

## 2024-02-22 RX ORDER — MORPHINE SULFATE 50 MG/1
4 CAPSULE, EXTENDED RELEASE ORAL EVERY 4 HOURS
Refills: 0 | Status: DISCONTINUED | OUTPATIENT
Start: 2024-02-22 | End: 2024-02-23

## 2024-02-22 RX ORDER — OXYCODONE HYDROCHLORIDE 5 MG/1
5 TABLET ORAL EVERY 6 HOURS
Refills: 0 | Status: DISCONTINUED | OUTPATIENT
Start: 2024-02-22 | End: 2024-02-23

## 2024-02-22 RX ORDER — FENTANYL CITRATE 50 UG/ML
25 INJECTION INTRAVENOUS
Refills: 0 | Status: DISCONTINUED | OUTPATIENT
Start: 2024-02-22 | End: 2024-02-22

## 2024-02-22 RX ORDER — EZETIMIBE 10 MG/1
1 TABLET ORAL
Refills: 0 | DISCHARGE

## 2024-02-22 RX ORDER — CEFAZOLIN SODIUM 1 G
2000 VIAL (EA) INJECTION EVERY 8 HOURS
Refills: 0 | Status: DISCONTINUED | OUTPATIENT
Start: 2024-02-22 | End: 2024-02-22

## 2024-02-22 RX ADMIN — FENTANYL CITRATE 25 MICROGRAM(S): 50 INJECTION INTRAVENOUS at 12:55

## 2024-02-22 RX ADMIN — SODIUM CHLORIDE 125 MILLILITER(S): 9 INJECTION INTRAMUSCULAR; INTRAVENOUS; SUBCUTANEOUS at 14:34

## 2024-02-22 RX ADMIN — Medication 2000 MILLIGRAM(S): at 15:49

## 2024-02-22 RX ADMIN — Medication 50 MILLIGRAM(S): at 22:00

## 2024-02-22 RX ADMIN — Medication 2000 MILLIGRAM(S): at 22:00

## 2024-02-22 RX ADMIN — ATORVASTATIN CALCIUM 40 MILLIGRAM(S): 80 TABLET, FILM COATED ORAL at 21:46

## 2024-02-22 RX ADMIN — HYDROMORPHONE HYDROCHLORIDE 0.5 MILLIGRAM(S): 2 INJECTION INTRAMUSCULAR; INTRAVENOUS; SUBCUTANEOUS at 12:08

## 2024-02-22 RX ADMIN — HYDROMORPHONE HYDROCHLORIDE 0.5 MILLIGRAM(S): 2 INJECTION INTRAMUSCULAR; INTRAVENOUS; SUBCUTANEOUS at 11:38

## 2024-02-22 RX ADMIN — HEPARIN SODIUM 5000 UNIT(S): 5000 INJECTION INTRAVENOUS; SUBCUTANEOUS at 21:46

## 2024-02-22 RX ADMIN — SODIUM CHLORIDE 1000 MILLILITER(S): 9 INJECTION, SOLUTION INTRAVENOUS at 11:38

## 2024-02-22 RX ADMIN — FENTANYL CITRATE 25 MICROGRAM(S): 50 INJECTION INTRAVENOUS at 12:25

## 2024-02-22 RX ADMIN — OXYCODONE HYDROCHLORIDE 5 MILLIGRAM(S): 5 TABLET ORAL at 14:34

## 2024-02-22 RX ADMIN — SODIUM CHLORIDE 1000 MILLILITER(S): 9 INJECTION, SOLUTION INTRAVENOUS at 12:27

## 2024-02-22 RX ADMIN — SODIUM CHLORIDE 125 MILLILITER(S): 9 INJECTION INTRAMUSCULAR; INTRAVENOUS; SUBCUTANEOUS at 21:55

## 2024-02-22 NOTE — PROGRESS NOTE ADULT - SUBJECTIVE AND OBJECTIVE BOX
68y Male with POD 0 s/p RA radical prostatectomy evaluated for post-op check. Doing well, verbalizes minimal incisional pain, using IS, tolerated clears. Denies CP, palpitation, SOB, N/V/abdominal pain    atorvastatin 40 milliGRAM(s) Oral at bedtime  ceFAZolin  Injectable. 2000 milliGRAM(s) IV Push every 8 hours  heparin   Injectable 5000 Unit(s) SubCutaneous every 8 hours  metoprolol succinate ER 50 milliGRAM(s) Oral at bedtime  morphine  - Injectable 4 milliGRAM(s) IV Push every 4 hours PRN  ondansetron Injectable 4 milliGRAM(s) IV Push every 6 hours PRN  oxyCODONE    IR 5 milliGRAM(s) Oral every 6 hours PRN  sodium chloride 0.9%. 1000 milliLiter(s) IV Continuous <Continuous>  valsartan 160 milliGRAM(s) Oral daily      Vital Signs Last 24 Hrs  T(C): 36.9 (22 Feb 2024 14:07), Max: 36.9 (22 Feb 2024 14:07)  T(F): 98.4 (22 Feb 2024 14:07), Max: 98.4 (22 Feb 2024 14:07)  HR: 80 (22 Feb 2024 14:07) (54 - 80)  BP: 154/78 (22 Feb 2024 14:07) (116/58 - 154/78)  BP(mean): --  RR: 17 (22 Feb 2024 14:07) (10 - 20)  SpO2: 100% (22 Feb 2024 14:07) (98% - 100%)    Parameters below as of 22 Feb 2024 14:07  Patient On (Oxygen Delivery Method): nasal cannula  O2 Flow (L/min): 2      I&O's Summary    22 Feb 2024 07:01  -  22 Feb 2024 15:05  --------------------------------------------------------  IN: 1500 mL / OUT: 182 mL / NET: 1318 mL      I&O's Detail    22 Feb 2024 07:01  -  22 Feb 2024 15:05  --------------------------------------------------------  IN:    Other (mL): 1000 mL    Other (mL): 500 mL  Total IN: 1500 mL    OUT:    Bulb (mL): 17 mL    Indwelling Catheter - Urethral (mL): 65 mL    Other (mL): 100 mL  Total OUT: 182 mL    Total NET: 1318 mL          Physical Exam  Gen: NAD, comfortable in bed  Neuro: A&Ox3  Lungs: CTAB  CV: S1/S2, RRR  Abd: Soft, ND, + incisional tenderness, no rebound no guarding.  +MARCUS in place with +5 cc serosanguinous drainage       : esqueda in place + 50cc   Extremities: Venodynes in place. No LE edema bl                          11.5   12.63 )-----------( 183      ( 22 Feb 2024 11:47 )             34.8       02-22    142  |  113<H>  |  20  ----------------------------<  145<H>  4.2   |  28  |  1.03    Ca    8.4<L>      22 Feb 2024 11:47

## 2024-02-22 NOTE — ASU PREOP CHECKLIST - AICD PRESENT
Someone inquired as to why this patient's cdiff testing was not done. Lab does not know. Other than calling the patient to ask why he didn't submit it, I'm only guessing that he just didn't drop off the sample for that test. Stools need to be liquid for cdiff testing, if he had submitted a formed stool, lab has a process to inform the ordering provider that testing was cancelled because of formed stool. Someone from the team will need to call the patient to inquire as to why he didn't submit the sample.     Thank you,   Sulema Lezama MLT ([facility])   no

## 2024-02-22 NOTE — ASU PATIENT PROFILE, ADULT - FALL HARM RISK - UNIVERSAL INTERVENTIONS
Bed in lowest position, wheels locked, appropriate side rails in place/Call bell, personal items and telephone in reach/Instruct patient to call for assistance before getting out of bed or chair/Non-slip footwear when patient is out of bed/[place] to call system/Physically safe environment - no spills, clutter or unnecessary equipment/Purposeful Proactive Rounding/Room/bathroom lighting operational, light cord in reach

## 2024-02-23 ENCOUNTER — TRANSCRIPTION ENCOUNTER (OUTPATIENT)
Age: 69
End: 2024-02-23

## 2024-02-23 VITALS
TEMPERATURE: 98 F | HEART RATE: 65 BPM | SYSTOLIC BLOOD PRESSURE: 132 MMHG | OXYGEN SATURATION: 91 % | RESPIRATION RATE: 18 BRPM | DIASTOLIC BLOOD PRESSURE: 65 MMHG

## 2024-02-23 PROBLEM — I25.2 OLD MYOCARDIAL INFARCTION: Chronic | Status: ACTIVE | Noted: 2024-02-12

## 2024-02-23 PROBLEM — I10 ESSENTIAL (PRIMARY) HYPERTENSION: Chronic | Status: ACTIVE | Noted: 2024-02-12

## 2024-02-23 PROBLEM — Z95.5 PRESENCE OF CORONARY ANGIOPLASTY IMPLANT AND GRAFT: Chronic | Status: ACTIVE | Noted: 2024-02-12

## 2024-02-23 PROBLEM — R73.03 PREDIABETES: Chronic | Status: ACTIVE | Noted: 2024-02-12

## 2024-02-23 PROBLEM — K63.5 POLYP OF COLON: Chronic | Status: ACTIVE | Noted: 2024-02-12

## 2024-02-23 PROBLEM — I25.10 ATHEROSCLEROTIC HEART DISEASE OF NATIVE CORONARY ARTERY WITHOUT ANGINA PECTORIS: Chronic | Status: ACTIVE | Noted: 2024-02-12

## 2024-02-23 LAB
BASOPHILS # BLD AUTO: 0.02 K/UL — SIGNIFICANT CHANGE UP (ref 0–0.2)
BASOPHILS NFR BLD AUTO: 0.2 % — SIGNIFICANT CHANGE UP (ref 0–2)
BUN SERPL-MCNC: 16 MG/DL — SIGNIFICANT CHANGE UP (ref 7–23)
CALCIUM SERPL-MCNC: 8 MG/DL — LOW (ref 8.5–10.1)
CHLORIDE SERPL-SCNC: 115 MMOL/L — HIGH (ref 96–108)
CO2 SERPL-SCNC: 28 MMOL/L — SIGNIFICANT CHANGE UP (ref 22–31)
CREAT SERPL-MCNC: 0.88 MG/DL — SIGNIFICANT CHANGE UP (ref 0.5–1.3)
EGFR: 93 ML/MIN/1.73M2 — SIGNIFICANT CHANGE UP
EOSINOPHIL # BLD AUTO: 0.01 K/UL — SIGNIFICANT CHANGE UP (ref 0–0.5)
EOSINOPHIL NFR BLD AUTO: 0.1 % — SIGNIFICANT CHANGE UP (ref 0–6)
GLUCOSE SERPL-MCNC: 120 MG/DL — HIGH (ref 70–99)
HCT VFR BLD CALC: 32.7 % — LOW (ref 39–50)
HGB BLD-MCNC: 10.7 G/DL — LOW (ref 13–17)
IMM GRANULOCYTES NFR BLD AUTO: 0.3 % — SIGNIFICANT CHANGE UP (ref 0–0.9)
LYMPHOCYTES # BLD AUTO: 1.26 K/UL — SIGNIFICANT CHANGE UP (ref 1–3.3)
LYMPHOCYTES # BLD AUTO: 9.8 % — LOW (ref 13–44)
MCHC RBC-ENTMCNC: 32.7 GM/DL — SIGNIFICANT CHANGE UP (ref 32–36)
MCHC RBC-ENTMCNC: 32.8 PG — SIGNIFICANT CHANGE UP (ref 27–34)
MCV RBC AUTO: 100.3 FL — HIGH (ref 80–100)
MONOCYTES # BLD AUTO: 1.17 K/UL — HIGH (ref 0–0.9)
MONOCYTES NFR BLD AUTO: 9.1 % — SIGNIFICANT CHANGE UP (ref 2–14)
NEUTROPHILS # BLD AUTO: 10.35 K/UL — HIGH (ref 1.8–7.4)
NEUTROPHILS NFR BLD AUTO: 80.5 % — HIGH (ref 43–77)
PLATELET # BLD AUTO: 200 K/UL — SIGNIFICANT CHANGE UP (ref 150–400)
POTASSIUM SERPL-MCNC: 4.7 MMOL/L — SIGNIFICANT CHANGE UP (ref 3.5–5.3)
POTASSIUM SERPL-SCNC: 4.7 MMOL/L — SIGNIFICANT CHANGE UP (ref 3.5–5.3)
RBC # BLD: 3.26 M/UL — LOW (ref 4.2–5.8)
RBC # FLD: 13.2 % — SIGNIFICANT CHANGE UP (ref 10.3–14.5)
SODIUM SERPL-SCNC: 143 MMOL/L — SIGNIFICANT CHANGE UP (ref 135–145)
WBC # BLD: 12.85 K/UL — HIGH (ref 3.8–10.5)
WBC # FLD AUTO: 12.85 K/UL — HIGH (ref 3.8–10.5)

## 2024-02-23 RX ORDER — ASPIRIN/CALCIUM CARB/MAGNESIUM 324 MG
1 TABLET ORAL
Qty: 0 | Refills: 0 | DISCHARGE

## 2024-02-23 RX ORDER — ACETAMINOPHEN 500 MG
2 TABLET ORAL
Qty: 0 | Refills: 0 | DISCHARGE

## 2024-02-23 RX ORDER — SENNA PLUS 8.6 MG/1
2 TABLET ORAL
Qty: 14 | Refills: 0
Start: 2024-02-23

## 2024-02-23 RX ORDER — OXYCODONE HYDROCHLORIDE 5 MG/1
1 TABLET ORAL
Qty: 12 | Refills: 0
Start: 2024-02-23

## 2024-02-23 RX ORDER — SODIUM CHLORIDE 9 MG/ML
3 INJECTION INTRAMUSCULAR; INTRAVENOUS; SUBCUTANEOUS EVERY 8 HOURS
Refills: 0 | Status: DISCONTINUED | OUTPATIENT
Start: 2024-02-23 | End: 2024-02-23

## 2024-02-23 RX ADMIN — HEPARIN SODIUM 5000 UNIT(S): 5000 INJECTION INTRAVENOUS; SUBCUTANEOUS at 05:39

## 2024-02-23 RX ADMIN — VALSARTAN 160 MILLIGRAM(S): 80 TABLET ORAL at 09:55

## 2024-02-23 RX ADMIN — SODIUM CHLORIDE 125 MILLILITER(S): 9 INJECTION INTRAMUSCULAR; INTRAVENOUS; SUBCUTANEOUS at 05:40

## 2024-02-23 NOTE — DISCHARGE NOTE PROVIDER - HOSPITAL COURSE
68 year old male with prostate cancer; Pt said he had elevated PSA during routine blood work; prostate biopsy, MRI prostate and PET scan done; denies dysuria or hematuria.   PAST MEDICAL HISTORY: CAD (coronary artery disease) , Colon polyp , History of ST elevation myocardial infarction (STEMI) , History of tobacco use , HLD (hyperlipidemia) , HTN, hypertension) , Prediabetes , Prostate cancer , Stented coronary artery.   PAST SURGICAL HISTORY: H/O prostate biopsy , H/O umbilical hernia repair , History of cardiac cath , S/P bilateral inguinal hernia repair.   Pt admitted to  on 2/22/24 to OR underwent a Robotic radical prostatectomy  Post op course was stable- MARCUS removed POD 1 Guerrero teaching done  Discharged home in stable condition with Guerrero   Follow up 3/1/24   68 year old male with prostate cancer; Pt said he had elevated PSA during routine blood work; prostate biopsy, MRI prostate and PET scan done; denies dysuria or hematuria.   PAST MEDICAL HISTORY: CAD (coronary artery disease) , Colon polyp , History of ST elevation myocardial infarction (STEMI) , History of tobacco use , HLD (hyperlipidemia) , HTN, hypertension) , Prediabetes , Prostate cancer , Stented coronary artery.   PAST SURGICAL HISTORY: H/O prostate biopsy , H/O umbilical hernia repair , History of cardiac cath , S/P bilateral inguinal hernia repair.   Pt admitted to  on 2/22/24 to OR underwent a Robotic radical prostatectomy  Post op course was stable- Guerrero teaching done  Discharged home in stable condition with Guerrero and MARCUS - MARCUS to be removed Monday 2/26   Follow up 3/1/24

## 2024-02-23 NOTE — DISCHARGE NOTE PROVIDER - CARE PROVIDERS DIRECT ADDRESSES
[email or web address]

## 2024-02-23 NOTE — DISCHARGE NOTE PROVIDER - NSDCFUSCHEDAPPT_GEN_ALL_CORE_FT
Raf Aburto  [facility] Physician [facility]  UROLOGY [address]  Scheduled Appointment: 03/01/2024

## 2024-02-23 NOTE — PROGRESS NOTE ADULT - ASSESSMENT
A/P: 68y  Male POD 0 s/p RA radical prostatectomy   ADAT  Labs appreciated  Strict I&O's  Pain controlled  DVT prophylaxis/OOB  Encourage Incentive spirometry
67yo male stable POD 1 s/p robotic assisted radical prostatectomy  Doing well  PLAN  ·	Diet as agustin  ·	IV SL  ·	OOB ambulating  ·	DC MARCUS drain later this am   ·	Home after lunch if continues to do well     Will YURI Aburto

## 2024-02-23 NOTE — DISCHARGE NOTE NURSING/CASE MANAGEMENT/SOCIAL WORK - PATIENT PORTAL LINK FT
You can access the FollowMyHealth Patient Portal offered by [facility] by registering at the following website: [email or web address]. By joining [facility]’s FollowMyHealth portal, you will also be able to view your health information using other applications (apps) compatible with our system.

## 2024-02-23 NOTE — DISCHARGE NOTE PROVIDER - CARE PROVIDER_API CALL
Raf Aburto  Urology  [address] [place] [id number]  Phone: [phone]  Fax: [phone]  Follow Up Time: 1 week   Raf Aburto  Urology  [address] [place] [id number]  Phone: [phone]  Fax: [phone]  Follow Up Time: 1 week    Leo Cook  Urology  [address] [place] [id number]  Phone: [phone]  Fax: [phone]  Scheduled Appointment: 02/26/2024

## 2024-02-23 NOTE — DISCHARGE NOTE PROVIDER - NSDCFUADDINST_GEN_ALL_CORE_FT
Drain MARCUS drain once daily, more often as needed. When you shower, please change the dressing where the drain enters your abdomen.

## 2024-02-23 NOTE — DISCHARGE NOTE PROVIDER - NSDCMRMEDTOKEN_GEN_ALL_CORE_FT
Aspir 81 oral delayed release tablet: 1 tab(s) orally once a day RESUME SATURDAY 2/24/24  atorvastatin 40 mg oral tablet: 1 tab(s) orally once a day (at bedtime)  Diabetic Supplement:   ezetimibe 10 mg oral tablet: 1 tab(s) orally once a day (at bedtime)  metoprolol succinate 50 mg oral tablet, extended release: 1 tab(s) orally once a day (at bedtime)  Multiple Vitamins oral tablet: 1 tab(s) orally once a day  oxyCODONE 5 mg oral tablet: 1 tab(s) orally every 6 hours as needed for  moderate pain MDD: 4  Tylenol 500 mg oral tablet: 2 tab(s) orally every 6 hours as needed for  mild pain  valsartan 160 mg oral tablet: 1 tab(s) orally once a day  Vitamin C:   Vitamin D:    Aspir 81 oral delayed release tablet: 1 tab(s) orally once a day RESUME SATURDAY 2/24/24  atorvastatin 40 mg oral tablet: 1 tab(s) orally once a day (at bedtime)  Diabetic Supplement:   ezetimibe 10 mg oral tablet: 1 tab(s) orally once a day (at bedtime)  metoprolol succinate 50 mg oral tablet, extended release: 1 tab(s) orally once a day (at bedtime)  Multiple Vitamins oral tablet: 1 tab(s) orally once a day  oxyCODONE 5 mg oral tablet: 1 tab(s) orally every 6 hours as needed for  moderate pain MDD: 4  Senna 8.6 mg oral tablet: 2 tab(s) orally once a day (at bedtime)  Tylenol 500 mg oral tablet: 2 tab(s) orally every 6 hours as needed for  mild pain  valsartan 160 mg oral tablet: 1 tab(s) orally once a day  Vitamin C:   Vitamin D:

## 2024-02-23 NOTE — PROGRESS NOTE ADULT - SUBJECTIVE AND OBJECTIVE BOX
Progress Note- Urology    POST OPERATIVE DAY #:    1        Status Post:  robotic assisted radical prostatectomy    SUBJECTIVE:   Feels well, has been OOB ambulating. Minimal pain, no NV agustin diet Flatus + BM -    MEDICATIONS  (STANDING):  atorvastatin 40 milliGRAM(s) Oral at bedtime  heparin   Injectable 5000 Unit(s) SubCutaneous every 8 hours  metoprolol succinate ER 50 milliGRAM(s) Oral at bedtime  sodium chloride 0.9%. 1000 milliLiter(s) (125 mL/Hr) IV Continuous <Continuous>  valsartan 160 milliGRAM(s) Oral daily    MEDICATIONS  (PRN):  morphine  - Injectable 4 milliGRAM(s) IV Push every 4 hours PRN Severe Pain (7 - 10)  ondansetron Injectable 4 milliGRAM(s) IV Push every 6 hours PRN Nausea  oxyCODONE    IR 5 milliGRAM(s) Oral every 6 hours PRN Moderate Pain (4 - 6)      Vital Signs Last 24 Hrs  T(C): 36.6 (23 Feb 2024 09:02), Max: 36.9 (22 Feb 2024 14:07)  T(F): 97.9 (23 Feb 2024 09:02), Max: 98.4 (22 Feb 2024 14:07)  HR: 65 (23 Feb 2024 09:02) (54 - 81)  BP: 132/65 (23 Feb 2024 09:02) (116/58 - 154/78)  BP(mean): --  RR: 18 (23 Feb 2024 09:02) (10 - 20)  SpO2: 91% (23 Feb 2024 09:02) (91% - 100%)    Parameters below as of 23 Feb 2024 09:02  Patient On (Oxygen Delivery Method): nasal cannula        PHYSICAL EXAM:    Constitutional:    WDWN male in nad    Respiratory:   CTA    Cardiovascular: rr s1s2 NL no M    Gastrointestinal:   Post op wounds all C/D/I  MARCUS serosanguinous     Genitourinary: Guerrero in place with STAT LOCK urine clear     Extremities:  no CCE      I&O's Detail    22 Feb 2024 07:01  -  23 Feb 2024 07:00  --------------------------------------------------------  IN:    Other (mL): 1000 mL    Other (mL): 500 mL  Total IN: 1500 mL    OUT:    Bulb (mL): 122 mL    Indwelling Catheter - Urethral (mL): 1715 mL    Other (mL): 100 mL  Total OUT: 1937 mL    Total NET: -437 mL          LABS:                        10.7   12.85 )-----------( 200      ( 23 Feb 2024 04:46 )             32.7     02-23    143  |  115<H>  |  16  ----------------------------<  120<H>  4.7   |  28  |  0.88    Ca    8.0<L>      23 Feb 2024 04:46        Urinalysis Basic - ( 23 Feb 2024 04:46 )    Color: x / Appearance: x / SG: x / pH: x  Gluc: 120 mg/dL / Ketone: x  / Bili: x / Urobili: x   Blood: x / Protein: x / Nitrite: x   Leuk Esterase: x / RBC: x / WBC x   Sq Epi: x / Non Sq Epi: x / Bacteria: x

## 2024-02-23 NOTE — DISCHARGE NOTE PROVIDER - PROVIDER TOKENS
PROVIDER:[TOKEN:[[id number]],FOLLOWUP:[1 week]] PROVIDER:[TOKEN:[[id number]],FOLLOWUP:[1 week]],PROVIDER:[TOKEN:[[id number]],SCHEDULEDAPPT:[02/26/2024]]

## 2024-02-25 ENCOUNTER — NON-APPOINTMENT (OUTPATIENT)
Age: 69
End: 2024-02-25

## 2024-02-26 ENCOUNTER — APPOINTMENT (OUTPATIENT)
Dept: UROLOGY | Facility: CLINIC | Age: 69
End: 2024-02-26
Payer: MEDICARE

## 2024-02-26 PROBLEM — C61 MALIGNANT NEOPLASM OF PROSTATE: Chronic | Status: ACTIVE | Noted: 2024-02-12

## 2024-02-26 PROBLEM — E78.5 HYPERLIPIDEMIA, UNSPECIFIED: Chronic | Status: ACTIVE | Noted: 2024-02-12

## 2024-02-26 PROCEDURE — 99024 POSTOP FOLLOW-UP VISIT: CPT

## 2024-02-26 NOTE — PHYSICAL EXAM
[Normal Appearance] : normal appearance [General Appearance - In No Acute Distress] : no acute distress [Well Groomed] : well groomed [Edema] : no peripheral edema [Exaggerated Use Of Accessory Muscles For Inspiration] : no accessory muscle use [Respiration, Rhythm And Depth] : normal respiratory rhythm and effort [Abdomen Soft] : soft [Costovertebral Angle Tenderness] : no ~M costovertebral angle tenderness [Abdomen Tenderness] : non-tender [Normal Station and Gait] : the gait and station were normal for the patient's age [Urinary Bladder Findings] : the bladder was normal on palpation [No Focal Deficits] : no focal deficits [] : no rash [Oriented To Time, Place, And Person] : oriented to person, place, and time [Mood] : the mood was normal [Affect] : the affect was normal [No Palpable Adenopathy] : no palpable adenopathy

## 2024-02-28 ENCOUNTER — APPOINTMENT (OUTPATIENT)
Dept: UROLOGY | Facility: CLINIC | Age: 69
End: 2024-02-28
Payer: MEDICARE

## 2024-02-28 PROCEDURE — 99024 POSTOP FOLLOW-UP VISIT: CPT

## 2024-02-28 NOTE — ASSESSMENT
[FreeTextEntry1] : 68-year-old male s/p prostatectomy.  Drain will remain in place due to output.  Will RTO on Wednesday to reassess drain for removal.

## 2024-02-28 NOTE — ASSESSMENT
[FreeTextEntry1] : 68 male with prostate cancer s/p ralp 2/22/2024  continue with drain and esqueda at this time RTC friday with drain log.

## 2024-02-28 NOTE — HISTORY OF PRESENT ILLNESS
[FreeTextEntry1] : 69 yo male s/p RALP 2/22/2024  has esqueda and ashley drain. still with good drain output.  Otherwise without complaints.

## 2024-02-28 NOTE — HISTORY OF PRESENT ILLNESS
[FreeTextEntry1] : 68-year-old male with Anita 4+3 disease now s/p robotic radical prostatectomy (02/22/2024) with Dr. Aburto. Presents to assess drain for removal. Patient is without  complaints. Drain output approximately 300cc in the past 3 days. 50cc between 7am and 11am this morning.

## 2024-03-01 ENCOUNTER — APPOINTMENT (OUTPATIENT)
Dept: UROLOGY | Facility: CLINIC | Age: 69
End: 2024-03-01
Payer: MEDICARE

## 2024-03-01 LAB — SURGICAL PATHOLOGY STUDY: SIGNIFICANT CHANGE UP

## 2024-03-01 PROCEDURE — 99024 POSTOP FOLLOW-UP VISIT: CPT

## 2024-03-01 NOTE — ASSESSMENT
[FreeTextEntry1] : Education provided on kegel exercise. Yolanda d/c in office MARCUS drain on RUQ removed <200cc /24 hr Pathology report in progress, will discuss next visit. Education provided education on post op care

## 2024-03-01 NOTE — PHYSICAL EXAM
[Normal Appearance] : normal appearance [General Appearance - In No Acute Distress] : no acute distress [Well Groomed] : well groomed [Edema] : no peripheral edema [Respiration, Rhythm And Depth] : normal respiratory rhythm and effort [Exaggerated Use Of Accessory Muscles For Inspiration] : no accessory muscle use [Abdomen Soft] : soft [Abdomen Tenderness] : non-tender [Costovertebral Angle Tenderness] : no ~M costovertebral angle tenderness [Urinary Bladder Findings] : the bladder was normal on palpation [] : no rash [Normal Station and Gait] : the gait and station were normal for the patient's age [No Focal Deficits] : no focal deficits [Affect] : the affect was normal [Oriented To Time, Place, And Person] : oriented to person, place, and time [Mood] : the mood was normal [No Palpable Adenopathy] : no palpable adenopathy

## 2024-03-26 ENCOUNTER — LABORATORY RESULT (OUTPATIENT)
Age: 69
End: 2024-03-26

## 2024-04-03 ENCOUNTER — APPOINTMENT (OUTPATIENT)
Dept: UROLOGY | Facility: CLINIC | Age: 69
End: 2024-04-03
Payer: MEDICARE

## 2024-04-03 DIAGNOSIS — C61 MALIGNANT NEOPLASM OF PROSTATE: ICD-10-CM

## 2024-04-03 PROCEDURE — 99024 POSTOP FOLLOW-UP VISIT: CPT

## 2024-04-03 RX ORDER — TADALAFIL 5 MG/1
5 TABLET ORAL
Qty: 30 | Refills: 6 | Status: ACTIVE | COMMUNITY
Start: 2024-04-03 | End: 1900-01-01

## 2024-04-03 NOTE — HISTORY OF PRESENT ILLNESS
[FreeTextEntry1] : 68 yom s/p RALP 2/22/24   PSA  0.02 on 3/2024  pt does x60 kegels a day reports some leakage, but has improved recently uses incontinence pads

## 2024-04-03 NOTE — PHYSICAL EXAM
[Normal Appearance] : normal appearance [General Appearance - In No Acute Distress] : no acute distress [Well Groomed] : well groomed [Edema] : no peripheral edema [Exaggerated Use Of Accessory Muscles For Inspiration] : no accessory muscle use [Respiration, Rhythm And Depth] : normal respiratory rhythm and effort [Abdomen Soft] : soft [Abdomen Tenderness] : non-tender [Costovertebral Angle Tenderness] : no ~M costovertebral angle tenderness [Urinary Bladder Findings] : the bladder was normal on palpation [Normal Station and Gait] : the gait and station were normal for the patient's age [] : no rash [No Focal Deficits] : no focal deficits [Oriented To Time, Place, And Person] : oriented to person, place, and time [Affect] : the affect was normal [Mood] : the mood was normal [No Palpable Adenopathy] : no palpable adenopathy

## 2024-05-06 ENCOUNTER — EMERGENCY (EMERGENCY)
Facility: HOSPITAL | Age: 69
LOS: 1 days | Discharge: HOME | End: 2024-05-06
Attending: EMERGENCY MEDICINE
Payer: MEDICARE

## 2024-05-06 VITALS
TEMPERATURE: 98 F | OXYGEN SATURATION: 98 % | RESPIRATION RATE: 16 BRPM | SYSTOLIC BLOOD PRESSURE: 132 MMHG | DIASTOLIC BLOOD PRESSURE: 74 MMHG | HEART RATE: 61 BPM

## 2024-05-06 VITALS
RESPIRATION RATE: 18 BRPM | SYSTOLIC BLOOD PRESSURE: 115 MMHG | TEMPERATURE: 98 F | WEIGHT: 179.9 LBS | DIASTOLIC BLOOD PRESSURE: 69 MMHG | HEIGHT: 72 IN | HEART RATE: 57 BPM | OXYGEN SATURATION: 97 %

## 2024-05-06 DIAGNOSIS — Z98.890 OTHER SPECIFIED POSTPROCEDURAL STATES: Chronic | ICD-10-CM

## 2024-05-06 DIAGNOSIS — E78.5 HYPERLIPIDEMIA, UNSPECIFIED: ICD-10-CM

## 2024-05-06 DIAGNOSIS — S00.31XA ABRASION OF NOSE, INITIAL ENCOUNTER: ICD-10-CM

## 2024-05-06 DIAGNOSIS — I25.10 ATHEROSCLEROTIC HEART DISEASE OF NATIVE CORONARY ARTERY WITHOUT ANGINA PECTORIS: ICD-10-CM

## 2024-05-06 DIAGNOSIS — Z87.891 PERSONAL HISTORY OF NICOTINE DEPENDENCE: ICD-10-CM

## 2024-05-06 DIAGNOSIS — Z79.82 LONG TERM (CURRENT) USE OF ASPIRIN: ICD-10-CM

## 2024-05-06 DIAGNOSIS — Z95.5 PRESENCE OF CORONARY ANGIOPLASTY IMPLANT AND GRAFT: ICD-10-CM
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
## 2024-05-06 DIAGNOSIS — Z85.46 PERSONAL HISTORY OF MALIGNANT NEOPLASM OF PROSTATE: ICD-10-CM

## 2024-05-06 DIAGNOSIS — Z90.79 ACQUIRED ABSENCE OF OTHER GENITAL ORGAN(S): ICD-10-CM
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
## 2024-05-06 DIAGNOSIS — R55 SYNCOPE AND COLLAPSE: ICD-10-CM

## 2024-05-06 DIAGNOSIS — R19.7 DIARRHEA, UNSPECIFIED: ICD-10-CM

## 2024-05-06 DIAGNOSIS — I25.2 OLD MYOCARDIAL INFARCTION: ICD-10-CM

## 2024-05-06 DIAGNOSIS — S00.01XA ABRASION OF SCALP, INITIAL ENCOUNTER: ICD-10-CM

## 2024-05-06 DIAGNOSIS — N39.0 URINARY TRACT INFECTION, SITE NOT SPECIFIED: ICD-10-CM

## 2024-05-06 LAB
ALBUMIN SERPL ELPH-MCNC: 4 G/DL — SIGNIFICANT CHANGE UP (ref 3.3–5.2)
ALP SERPL-CCNC: 72 U/L — SIGNIFICANT CHANGE UP (ref 40–120)
ALT FLD-CCNC: 38 U/L — SIGNIFICANT CHANGE UP
ANION GAP SERPL CALC-SCNC: 11 MMOL/L — SIGNIFICANT CHANGE UP (ref 5–17)
APPEARANCE UR: ABNORMAL
APTT BLD: 29.8 SEC — SIGNIFICANT CHANGE UP (ref 24.5–35.6)
AST SERPL-CCNC: 28 U/L — SIGNIFICANT CHANGE UP
BACTERIA # UR AUTO: ABNORMAL /HPF
BASOPHILS # BLD AUTO: 0.03 K/UL — SIGNIFICANT CHANGE UP (ref 0–0.2)
BASOPHILS NFR BLD AUTO: 0.2 % — SIGNIFICANT CHANGE UP (ref 0–2)
BILIRUB SERPL-MCNC: 0.5 MG/DL — SIGNIFICANT CHANGE UP (ref 0.4–2)
BILIRUB UR-MCNC: NEGATIVE — SIGNIFICANT CHANGE UP
BUN SERPL-MCNC: 27.1 MG/DL — HIGH (ref 8–20)
CALCIUM SERPL-MCNC: 9.2 MG/DL — SIGNIFICANT CHANGE UP (ref 8.4–10.5)
CAST: 2 /LPF — SIGNIFICANT CHANGE UP (ref 0–4)
CHLORIDE SERPL-SCNC: 103 MMOL/L — SIGNIFICANT CHANGE UP (ref 96–108)
CO2 SERPL-SCNC: 24 MMOL/L — SIGNIFICANT CHANGE UP (ref 22–29)
COLOR SPEC: YELLOW — SIGNIFICANT CHANGE UP
CREAT SERPL-MCNC: 1.17 MG/DL — SIGNIFICANT CHANGE UP (ref 0.5–1.3)
DIFF PNL FLD: ABNORMAL
EGFR: 67 ML/MIN/1.73M2 — SIGNIFICANT CHANGE UP
EOSINOPHIL # BLD AUTO: 0.11 K/UL — SIGNIFICANT CHANGE UP (ref 0–0.5)
EOSINOPHIL NFR BLD AUTO: 0.9 % — SIGNIFICANT CHANGE UP (ref 0–6)
GLUCOSE SERPL-MCNC: 140 MG/DL — HIGH (ref 70–99)
GLUCOSE UR QL: NEGATIVE MG/DL — SIGNIFICANT CHANGE UP
HCT VFR BLD CALC: 39.8 % — SIGNIFICANT CHANGE UP (ref 39–50)
HGB BLD-MCNC: 13.1 G/DL — SIGNIFICANT CHANGE UP (ref 13–17)
IMM GRANULOCYTES NFR BLD AUTO: 0.4 % — SIGNIFICANT CHANGE UP (ref 0–0.9)
INR BLD: 0.98 RATIO — SIGNIFICANT CHANGE UP (ref 0.85–1.18)
KETONES UR-MCNC: NEGATIVE MG/DL — SIGNIFICANT CHANGE UP
LEUKOCYTE ESTERASE UR-ACNC: ABNORMAL
LYMPHOCYTES # BLD AUTO: 0.88 K/UL — LOW (ref 1–3.3)
LYMPHOCYTES # BLD AUTO: 7.1 % — LOW (ref 13–44)
MCHC RBC-ENTMCNC: 32.3 PG — SIGNIFICANT CHANGE UP (ref 27–34)
MCHC RBC-ENTMCNC: 32.9 GM/DL — SIGNIFICANT CHANGE UP (ref 32–36)
MCV RBC AUTO: 98.3 FL — SIGNIFICANT CHANGE UP (ref 80–100)
MONOCYTES # BLD AUTO: 0.85 K/UL — SIGNIFICANT CHANGE UP (ref 0–0.9)
MONOCYTES NFR BLD AUTO: 6.8 % — SIGNIFICANT CHANGE UP (ref 2–14)
NEUTROPHILS # BLD AUTO: 10.5 K/UL — HIGH (ref 1.8–7.4)
NEUTROPHILS NFR BLD AUTO: 84.6 % — HIGH (ref 43–77)
NITRITE UR-MCNC: POSITIVE
PH UR: 6 — SIGNIFICANT CHANGE UP (ref 5–8)
PLATELET # BLD AUTO: 221 K/UL — SIGNIFICANT CHANGE UP (ref 150–400)
POTASSIUM SERPL-MCNC: 5.3 MMOL/L — SIGNIFICANT CHANGE UP (ref 3.5–5.3)
POTASSIUM SERPL-SCNC: 5.3 MMOL/L — SIGNIFICANT CHANGE UP (ref 3.5–5.3)
PROT SERPL-MCNC: 6.7 G/DL — SIGNIFICANT CHANGE UP (ref 6.6–8.7)
PROT UR-MCNC: 30 MG/DL
PROTHROM AB SERPL-ACNC: 10.9 SEC — SIGNIFICANT CHANGE UP (ref 9.5–13)
RBC # BLD: 4.05 M/UL — LOW (ref 4.2–5.8)
RBC # FLD: 13.8 % — SIGNIFICANT CHANGE UP (ref 10.3–14.5)
RBC CASTS # UR COMP ASSIST: 1 /HPF — SIGNIFICANT CHANGE UP (ref 0–4)
SODIUM SERPL-SCNC: 138 MMOL/L — SIGNIFICANT CHANGE UP (ref 135–145)
SP GR SPEC: 1.01 — SIGNIFICANT CHANGE UP (ref 1–1.03)
SQUAMOUS # UR AUTO: 0 /HPF — SIGNIFICANT CHANGE UP (ref 0–5)
TROPONIN T, HIGH SENSITIVITY RESULT: 7 NG/L — SIGNIFICANT CHANGE UP (ref 0–51)
TROPONIN T, HIGH SENSITIVITY RESULT: 9 NG/L — SIGNIFICANT CHANGE UP (ref 0–51)
UROBILINOGEN FLD QL: 0.2 MG/DL — SIGNIFICANT CHANGE UP (ref 0.2–1)
WBC # BLD: 12.42 K/UL — HIGH (ref 3.8–10.5)
WBC # FLD AUTO: 12.42 K/UL — HIGH (ref 3.8–10.5)
WBC UR QL: 589 /HPF — HIGH (ref 0–5)

## 2024-05-06 PROCEDURE — 99285 EMERGENCY DEPT VISIT HI MDM: CPT | Mod: 25

## 2024-05-06 PROCEDURE — 71045 X-RAY EXAM CHEST 1 VIEW: CPT | Mod: 26

## 2024-05-06 PROCEDURE — 99285 EMERGENCY DEPT VISIT HI MDM: CPT | Mod: GC

## 2024-05-06 PROCEDURE — 93005 ELECTROCARDIOGRAM TRACING: CPT

## 2024-05-06 PROCEDURE — 93010 ELECTROCARDIOGRAM REPORT: CPT

## 2024-05-06 PROCEDURE — 87186 SC STD MICRODIL/AGAR DIL: CPT

## 2024-05-06 PROCEDURE — 71045 X-RAY EXAM CHEST 1 VIEW: CPT

## 2024-05-06 PROCEDURE — 85025 COMPLETE CBC W/AUTO DIFF WBC: CPT

## 2024-05-06 PROCEDURE — 81001 URINALYSIS AUTO W/SCOPE: CPT

## 2024-05-06 PROCEDURE — 72125 CT NECK SPINE W/O DYE: CPT | Mod: MC

## 2024-05-06 PROCEDURE — 99285 EMERGENCY DEPT VISIT HI MDM: CPT

## 2024-05-06 PROCEDURE — 72125 CT NECK SPINE W/O DYE: CPT | Mod: 26,MC

## 2024-05-06 PROCEDURE — 70450 CT HEAD/BRAIN W/O DYE: CPT | Mod: 26,MC

## 2024-05-06 PROCEDURE — 36415 COLL VENOUS BLD VENIPUNCTURE: CPT

## 2024-05-06 PROCEDURE — 70450 CT HEAD/BRAIN W/O DYE: CPT | Mod: MC

## 2024-05-06 PROCEDURE — 80053 COMPREHEN METABOLIC PANEL: CPT

## 2024-05-06 PROCEDURE — 85610 PROTHROMBIN TIME: CPT

## 2024-05-06 PROCEDURE — 87077 CULTURE AEROBIC IDENTIFY: CPT

## 2024-05-06 PROCEDURE — 87086 URINE CULTURE/COLONY COUNT: CPT

## 2024-05-06 PROCEDURE — 84484 ASSAY OF TROPONIN QUANT: CPT

## 2024-05-06 PROCEDURE — 85730 THROMBOPLASTIN TIME PARTIAL: CPT

## 2024-05-06 PROCEDURE — 96360 HYDRATION IV INFUSION INIT: CPT

## 2024-05-06 RX ORDER — CEPHALEXIN 500 MG
1 CAPSULE ORAL
Qty: 28 | Refills: 0
Start: 2024-05-06 | End: 2024-05-12

## 2024-05-06 RX ORDER — SODIUM CHLORIDE 9 MG/ML
1000 INJECTION INTRAMUSCULAR; INTRAVENOUS; SUBCUTANEOUS ONCE
Refills: 0 | Status: COMPLETED | OUTPATIENT
Start: 2024-05-06 | End: 2024-05-06

## 2024-05-06 RX ORDER — CEPHALEXIN 500 MG
500 CAPSULE ORAL
Refills: 0 | Status: DISCONTINUED | OUTPATIENT
Start: 2024-05-06 | End: 2024-05-13

## 2024-05-06 RX ADMIN — SODIUM CHLORIDE 1000 MILLILITER(S): 9 INJECTION INTRAMUSCULAR; INTRAVENOUS; SUBCUTANEOUS at 09:39

## 2024-05-06 RX ADMIN — Medication 500 MILLIGRAM(S): at 15:34

## 2024-05-06 RX ADMIN — SODIUM CHLORIDE 1000 MILLILITER(S): 9 INJECTION INTRAMUSCULAR; INTRAVENOUS; SUBCUTANEOUS at 10:40

## 2024-05-06 NOTE — ED PROVIDER NOTE - CLINICAL SUMMARY MEDICAL DECISION MAKING FREE TEXT BOX
Pt is a 70 yo male with PMH of CAD s/p 1 stent and MI on aspirin 81mg, prostate ca s/p radical prostatectomy 2/2024, HLD presenting after multiple syncopal episodes today.    VS stable, exam unremarkable.    vasovagal/orthostatic vs cardiac syncope    ACS workup given strong cardiac hx, ct head given recent ca hx and multiple syncopes    iv fluids given, blood pressure on the soft side, possible orthostatic hypotension Pt is a 70 yo male with PMH of CAD s/p 1 stent and MI on aspirin 81mg, prostate ca s/p radical prostatectomy 2/2024, HLD presenting after multiple syncopal episodes today.    VS stable, exam unremarkable.    vasovagal/orthostatic vs cardiac syncope    ACS workup, cards consult given strong cardiac hx    ct head given recent ca hx and multiple syncopes    iv fluids given, blood pressure on the soft side, possible orthostatic hypotension Pt is a 70 yo male with PMH of CAD s/p 1 stent and MI on aspirin 81mg, prostate ca s/p radical prostatectomy 2/2024, HLD presenting after syncopal episode today.    VS stable, exam unremarkable.    vasovagal/orthostatic vs cardiac syncope    ACS workup, cards consult given strong cardiac hx    ct head given recent ca hx and syncope    iv fluids given, blood pressure on the soft side, possible orthostatic hypotension Pt is a 68 yo male with PMH of CAD s/p 1 stent and MI on aspirin 81mg, prostate ca s/p radical prostatectomy 2/2024, HLD presenting after syncopal episode today.    VS stable, exam unremarkable.    vasovagal/orthostatic given prodrome of dizziness vs cardiac syncope    ACS workup, cards consult given strong cardiac hx    ct head given recent ca hx and syncope    iv fluids given, blood pressure on the soft side, possible orthostatic hypotension Pt is a 70 yo male with PMH of CAD s/p 1 stent and MI on aspirin 81mg, prostate ca s/p radical prostatectomy 2/2024, HLD presenting after syncopal episode today.    VS stable, exam unremarkable.    vasovagal/orthostatic given prodrome of dizziness vs cardiac syncope    ACS workup, cards consult given strong cardiac hx    ct head given recent ca hx and syncope, negative.    Cards saw pt, outpt followup recommended, ua recommended.    UA shows uti, given first dose keflex and sent outpt prescription.     iv fluids given, blood pressure on the soft side, possible orthostatic hypotension

## 2024-05-06 NOTE — ED ADULT TRIAGE NOTE - CHIEF COMPLAINT QUOTE
pt states that he syncopized in shower , denies chest pain or sob, pt has abrasion to nose, takes ASA  denies blood thinners

## 2024-05-06 NOTE — CONSULT NOTE ADULT - NS ATTEND AMEND GEN_ALL_CORE FT
syncop. orthostatic dehydration.  urineary retention. r/o UTI.  IV hydration. cut  ARB and beta-blocker in half.  consider Flomax for prostate. f/u with urologiost  outpaitent 4 weeks MCOT monitor.  No further in-patient cardiac work-up/management is needed.  Follow-up in cardiology office in 2 weeks.

## 2024-05-06 NOTE — ED ADULT NURSE NOTE - OBJECTIVE STATEMENT
Pt to ED from home w/ reports of feeling dizzy this am and having syncopal episode while in the shower. Pt then had 2 subsequent syncopal episodes, the last of which pt was noted to have fecal incontinence. 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 Pt noted to have abrasion to nasal bridge and to posterior head, bleeding controlled. Pt denies head, neck or back pain, cp, sob, weakness, n/v/d, diaphoresis, difficulty walking/talking. Pt takes 81mg ASA daily. Pt A&Ox4 in NAD @ this time. RR even & unlabored. Pt SR w/ sinus arrhythmia on cardiac monitor. Hx CAD w/ stents, MI, prostate CA, HLD.

## 2024-05-06 NOTE — ED ADULT NURSE NOTE - NS PRO PASSIVE SMOKE EXP
Today, you came in to urgent care to be evaluated for back pain. Usually, no matter what we do to treat back pain, it will take a while for it to go away. For now, please try to continue your normal activities as tolerated (but don’t push yourself too far). You may take tylenol 500mg every 6 hours as needed for pain.  and I have sent you home with medications for pain. Please take these medications as prescribed. You may also use heat or ice (whichever feels better) for pain relief. If your pain does not start to improve within the week or so, please follow-up with your primary care provider. Please return to a hospital-based emergency room if you notice significant worsening of symptoms, numbness or tingling down into your legs, bowel or bladder accidents or inability to go to the bathroom, fevers, chills, new or different symptoms.    Thank you for coming in to urgent care today. I hope you feel better soon!    -Trina Morales PA-C     No

## 2024-05-06 NOTE — CONSULT NOTE ADULT - ASSESSMENT
Pt is a 68 yo male with PMH of CAD s/p 1 stent and MI on aspirin 81mg, prostate ca s/p radical prostatectomy 2024, HLD presenting after syncope. Pt got up this morning with a mandeep's horse and went into the shower when he felt dizzy and he passed out. Pt's wife heard a thud and came and saw him on the ground pale and then he had LOC again when she tried to stand him up and had fecal incontinence with diarrhea. Pt reports that he had a syncopal episode 30 years ago. Pt follows with cardiologist, last seen 2023 for clearance for prostatectomy. Pt denies fever, chills, headache, chest pain, SOB, abdominal pain, nausea/vomiting, hematochezia, urinary symptoms    Outpatient Cardiac Records  EC2024: Sinus rhythm 61 WNL  EC :" Sinus Bradycardia  Cardiac Cath/PCI: Aprr: lateral STEMI, LHC revealed 100% occluded Diag 2 with DESx1 (LYNNE 2.5x26mm).    Carotid/Aorta/Peripheral Vascular: 3/2022 Duplex Aorta: No AAA mild atherosclerosis in abd aorta  2022 SHU/PVR normal study  2023 Carotid US: Overall plaque burden is mild without evidence of hemodynamically significant stenosis bilaterally.

## 2024-05-06 NOTE — CONSULT NOTE ADULT - SUBJECTIVE AND OBJECTIVE BOX
[facility]                                              CARDIOLOGY AT [place] [address]                                             Telephone: [phone]. Fax:[phone]                                                       CARDIOLOGY CONSULTATION NOTE                                                                                             History obtained by: Patient and medical record   Community Cardiologist: John   obtained: Yes [  ] No [  ]  Reason for Consultation: Syncope   Available out pt records reviewed: Yes [  ] No [  ]    Chief complaint:    Patient is a 69y old  Male who presents with a chief complaint of Syncope    HPI:  Pt is a 68 yo male with PMH of CAD s/p 1 stent and MI on aspirin 81mg, prostate ca s/p radical prostatectomy 2/2024, HLD presenting after syncope. Pt got up this morning with a mandeep's horse and went into the shower when he felt dizzy and he passed out. Pt's wife heard a thud and came and saw him on the ground pale and then he had LOC again when she tried to stand him up and had fecal incontinence with diarrhea. Pt reports that he had a syncopal episode 30 years ago. Pt follows with cardiologist, last seen 11/2023 for clearance for prostatectomy. Pt denies fever, chills, headache, chest pain, SOB, abdominal pain, nausea/vomiting, hematochezia, urinary symptoms    PAST MEDICAL HISTORY  No pertinent past medical history    History of tobacco use    Prostate cancer    HTN (hypertension)    HLD (hyperlipidemia)    Colon polyp    Stented coronary artery    CAD (coronary artery disease)    History of ST elevation myocardial infarction (STEMI)    Prediabetes        PAST SURGICAL HISTORY  History of cardiac cath    S/P bilateral inguinal hernia repair    H/O umbilical hernia repair    H/O prostate biopsy        SUBSTANCE USE HISTORY  Denies current and previous substance use [  ]   CIGARETTES -   ALCOHOL -   DRUGS -     FAMILY HISTORY:  FH: coronary artery disease (Mother)        CARDIAC SPECIFIC FAMILY HX   No KNOWN family history of Cardiovascular disease, CAD, or sudden death in first degree relatives unless specified below  Family History of Cardiovascular Disease:  [  ]   Coronary Artery Disease in first degree relative:  [  ]   Sudden Cardiac Death in First degree relative: [  ]    HOME MEDICATIONS:  Aspir 81 oral delayed release tablet: 1 tab(s) orally once a day RESUME SATURDAY 2/24/24 (23 Feb 2024 07:45)  atorvastatin 40 mg oral tablet: 1 tab(s) orally once a day (at bedtime) (22 Feb 2024 06:28)  Diabetic Supplement:  (22 Feb 2024 06:28)  ezetimibe 10 mg oral tablet: 1 tab(s) orally once a day (at bedtime) (22 Feb 2024 06:28)  metoprolol succinate 50 mg oral tablet, extended release: 1 tab(s) orally once a day (at bedtime) (22 Feb 2024 06:28)  Multiple Vitamins oral tablet: 1 tab(s) orally once a day (22 Feb 2024 06:28)  Tylenol 500 mg oral tablet: 2 tab(s) orally every 6 hours as needed for  mild pain (23 Feb 2024 07:45)  valsartan 160 mg oral tablet: 1 tab(s) orally once a day (22 Feb 2024 06:28)  Vitamin C:  (22 Feb 2024 06:28)  Vitamin D:  (22 Feb 2024 06:28)      CURRENT CARDIAC MEDICATIONS:      CURRENT OTHER MEDICATIONS:      ALLERGIES:   No Known Allergies      VITAL SIGNS:  T(C): 36.4 (05-06-24 @ 08:28), Max: 36.4 (05-06-24 @ 08:28)  T(F): 97.6 (05-06-24 @ 08:28), Max: 97.6 (05-06-24 @ 08:28)  HR: 53 (05-06-24 @ 11:03) (53 - 57)  BP: 128/72 (05-06-24 @ 11:03) (115/69 - 128/72)  RR: 16 (05-06-24 @ 11:03) (16 - 18)  SpO2: 97% (05-06-24 @ 11:03) (97% - 97%)    INTAKE AND OUTPUT:       LABS:                            13.1   12.42 )-----------( 221      ( 06 May 2024 09:10 )             39.8     05-06    138  |  103  |  27.1<H>  ----------------------------<  140<H>  5.3   |  24.0  |  1.17    Ca    9.2      06 May 2024 09:10    TPro  6.7  /  Alb  4.0  /  TBili  0.5  /  DBili  x   /  AST  28  /  ALT  38  /  AlkPhos  72  05-06    PT/INR - ( 06 May 2024 09:10 )   PT: 10.9 sec;   INR: 0.98 ratio         PTT - ( 06 May 2024 09:10 )  PTT:29.8 sec  Urinalysis Basic - ( 06 May 2024 09:10 )    Color: x / Appearance: x / SG: x / pH: x  Gluc: 140 mg/dL / Ketone: x  / Bili: x / Urobili: x   Blood: x / Protein: x / Nitrite: x   Leuk Esterase: x / RBC: x / WBC x   Sq Epi: x / Non Sq Epi: x / Bacteria: x              RADIOLOGY IMAGING:   Xray Chest 1 View- PORTABLE-Urgent: Urgent   Indication: Chest Pain  Transport: Portable  Exam Completed (05-06-24 @ 08:59) [Results Available]  12 Lead ECG:   Provider's Contact #: [phone] (05-06-24 @ 08:32) [Completed]

## 2024-05-06 NOTE — ED PROVIDER NOTE - OBJECTIVE STATEMENT
Pt is a 70 yo male with PMH of CAD s/p 1 stent and MI on aspirin 81mg, prostate ca s/p radical prostatectomy 2/2024, HLD presenting after syncope. Pt got up this morning with a mandeep's horse and went into the shower when he felt dizzy and he passed out. Pt's wife heard a thud and came and saw him on the ground pale and then he syncopized again and had fecal incontinence. Pt reports that he syncopized 30 years ago. Pt follows with cardiologist, last seen 11/2023 for clearance for prostatectomy. Pt denies fever, chills, chest pain, SOB, abdominal pain. Pt is a 68 yo male with PMH of CAD s/p 1 stent and MI on aspirin 81mg, prostate ca s/p radical prostatectomy 2/2024, HLD presenting after syncope. Pt got up this morning with a mandeep's horse and went into the shower when he felt dizzy and he passed out. Pt's wife heard a thud and came and saw him on the ground pale and then he had LOC again and had fecal incontinence. Pt reports that he had a syncopal episode 30 years ago. Pt follows with cardiologist, last seen 11/2023 for clearance for prostatectomy. Pt denies fever, chills, chest pain, SOB, abdominal pain. Pt is a 68 yo male with PMH of CAD s/p 1 stent and MI on aspirin 81mg, prostate ca s/p radical prostatectomy 2/2024, HLD presenting after syncope. Pt got up this morning with a mandeep's horse and went into the shower when he felt dizzy and he passed out. Pt's wife heard a thud and came and saw him on the ground pale and then he had LOC again and had fecal incontinence with diarrhea. Pt reports that he had a syncopal episode 30 years ago. Pt follows with cardiologist, last seen 11/2023 for clearance for prostatectomy. Pt denies fever, chills, chest pain, SOB, abdominal pain, vomiting, hematochezia, urinary symptoms. Pt is a 70 yo male with PMH of CAD s/p 1 stent and MI on aspirin 81mg, prostate ca s/p radical prostatectomy 2/2024, HLD presenting after syncope. Pt got up this morning with a mandeep's horse and went into the shower when he felt dizzy and he passed out. Pt's wife heard a thud and came and saw him on the ground pale and then he had LOC again when she tried to stand him up and had fecal incontinence with diarrhea. Pt reports that he had a syncopal episode 30 years ago. Pt follows with cardiologist, last seen 11/2023 for clearance for prostatectomy. Pt denies fever, chills, chest pain, SOB, abdominal pain, vomiting, hematochezia, urinary symptoms. Pt is a 70 yo male with PMH of CAD s/p 1 stent and MI on aspirin 81mg, prostate ca s/p radical prostatectomy 2/2024, HLD presenting after syncope. Pt got up this morning with a mandeep's horse and went into the shower when he felt dizzy and he passed out. Pt's wife heard a thud and came and saw him on the ground pale and then he had LOC again when she tried to stand him up and had fecal incontinence with diarrhea. Pt reports that he had a syncopal episode 30 years ago. Pt follows with cardiologist, last seen 11/2023 for clearance for prostatectomy. Pt denies fever, chills, headache, chest pain, SOB, abdominal pain, nausea/vomiting, hematochezia, urinary symptoms.

## 2024-05-06 NOTE — ED PROVIDER NOTE - PATIENT PORTAL LINK FT
You can access the FollowMyHealth Patient Portal offered by [facility] by registering at the following website: [email or web address]. By joining [facility]’s FollowMyHealth portal, you will also be able to view your health information using other applications (apps) compatible with our system.

## 2024-05-06 NOTE — ED PROVIDER NOTE - ATTENDING CONTRIBUTION TO CARE
I personally saw the patient with the resident, and completed the key components of the history and physical exam. I then discussed the management plan with the resident.    70 y/o M with PMH CAD s/p 1 stent, STEMI, prostate Ca s/p recent total radical prostatectomy in Feb 2024 presents after an episode of syncope this morning in the shower. Patient states that he had a cramp in his calf just prior to the episode, bent down, but felt prodrome of lightheadedness prior to the episode. He fell out of the shower, wife heard him fall, found him on the ground, attempted to sit him up, causing him to syncopize another 2 times with fecal incontinence. No tongue biting or convulsions, no post-ictal period. Patient has a history of something similar 30 years ago, nothing since. 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 He saw his cardiologist in February for cardiac clearance for his surgery and was cleared. Patient states he now feels fine.    NAD, well appearing, no pallor, RRR, lungs CTA B/L, abd soft NT/ND, no LE edema, 2+ symmetrical distal pulses, no focal neuro deficits, abrasion to the bridge of nose without epistaxis or active bleeding, no raccoon eyes, no midline cervical/thoracic or lumbar TTP or step offs, chest wall stable, pelvis stable, long bones stable.    CT head/C/S, will monitor on tele, labs, CXR - will check orthostatics - likely vasovagal syncope, although due to patient's cardiac history, will consult cardiology - will reassess.

## 2024-05-06 NOTE — ED ADULT NURSE NOTE - NSFALLRISKINTERV_ED_ALL_ED
Assistance OOB with selected safe patient handling equipment if applicable/Communicate fall risk and risk factors to all staff, patient, and family/Orthostatic vital signs/Provide visual cue: yellow wristband, yellow gown, etc/Reinforce activity limits and safety measures with patient and family/Call bell, personal items and telephone in reach/Instruct patient to call for assistance before getting out of bed/chair/stretcher/Non-slip footwear applied when patient is off stretcher/[place] to call system/Physically safe environment - no spills, clutter or unnecessary equipment/Purposeful Proactive Rounding/Room/bathroom lighting operational, light cord in reach

## 2024-05-06 NOTE — CONSULT NOTE ADULT - PROBLEM SELECTOR RECOMMENDATION 9
- appears to be vasovagal syncope   - pt has prostate cancer s/p recent prostatectomy in february, admits to not drinking water because he has incontinence  - In am he had muscle cramping of his LLE calf muscle, he went to take a shower to loosen the muscle cramping and he passed out in the shower  - his wife found him upstairs, when he stood up he passed out again twice, this time with slurred speech and fecal incontinence   - differential is most consistent with vasovagal syncope  - check echo and orthostatic BP/HR   - troponin and EKG are unremarkable   - no angina/anginal equivalents.   - potentially just dehydration, give IVF. consider non-cardiac etiologies including infection, WBC is mildly elevated. CXR is normal.

## 2024-05-06 NOTE — ED ADULT NURSE REASSESSMENT NOTE - NS ED NURSE REASSESS COMMENT FT1
Pt laying in bed, talking w/ wife @ bedside. Pt A&Ox4 in NAD @ this time. RR even & unlabored. Pt sinus avelino on cardiac monitor. Pt denies any pain, complaints, or needs @ this time. Pt awaiting results. Safety maintained.

## 2024-05-06 NOTE — ED PROVIDER NOTE - NSFOLLOWUPINSTRUCTIONS_ED_ALL_ED_FT
Urinary Tract Infection    Please take keflex as prescribed.    A urinary tract infection (UTI) is an infection of any part of the urinary tract, which includes the kidneys, ureters, bladder, and urethra. Risk factors include ignoring your need to urinate, wiping back to front if female, being an uncircumcised male, and having diabetes or a weak immune system. Symptoms include frequent urination, pain or burning with urination, foul smelling urine, cloudy urine, pain in the lower abdomen, blood in the urine, and fever. If you were prescribed an antibiotic medicine, take it as told by your health care provider. Do not stop taking the antibiotic even if you start to feel better.    SEEK IMMEDIATE MEDICAL CARE IF YOU HAVE ANY OF THE FOLLOWING SYMPTOMS: severe back or abdominal pain, fever, inability to keep fluids or medicine down, dizziness/lightheadedness, or a change in mental status.    Syncope    Please follow up with your cardiologist outpatient as instructed.     Syncope is when you temporarily lose consciousness, also called fainting or passing out. It is caused by a sudden decrease in blood flow to the brain. Even though most causes of syncope are not dangerous, syncope can possibly be a sign of a serious medical problem. Signs that you may be about to faint include feeling dizzy, lightheaded, nausea, visual changes, or cold/clammy skin. Do not drive, operate heavy machinery, or play sports until your health care provider says it is okay.    SEEK IMMEDIATE MEDICAL CARE IF YOU HAVE ANY OF THE FOLLOWING SYMPTOMS: severe headache, pain in your chest/abdomen/back, bleeding from your mouth or rectum, palpitations, shortness of breath, pain with breathing, seizure, confusion, or trouble walking.

## 2024-05-06 NOTE — ED PROVIDER NOTE - PHYSICAL EXAMINATION
General: NAD, well appearing  HEENT: Normocephalic, atraumatic  Neck: No apparent stiffness or JVD  Pulm: Chest wall symmetric and nontender, lungs clear to ascultation   Cardiac: Regular rate and regular rhythm  Abdomen: Nontender and nondistended  Skin: Skin is warm, dry and intact without rashes or lesions.  Neuro: No motor or sensory deficits above reported baseline  MSK: No deformity or tenderness above reported baseline General: NAD, well appearing  HEENT: Normocephalic, atraumatic  Neck: No apparent stiffness or JVD  Pulm: Chest wall symmetric and nontender, lungs clear to ascultation   Cardiac: Regular rate and regular rhythm  Abdomen: Nontender and nondistended  Skin: Skin is warm, dry, superficial minor abrasion on nasal bridge, back of scalp, no active bleeding  Neuro: No motor or sensory deficits above reported baseline  MSK: No deformity or tenderness above reported baseline

## 2024-05-06 NOTE — ED ADULT TRIAGE NOTE - CCCP TRG CHIEF CMPLNT
"Routing refill request to provider for review/approval because:  Drug not on the [facility] refill protocol   BP not in range.  ACT score out of range.    Last Written Prescription Date:  8/3/21  Last Fill Quantity: 90,  # refills: 1   Last office visit provider:  8/15/22     Last Written Prescription Date:  6/5/22  Last Fill Quantity: 13,  # refills: 0   Last office visit provider:  8/15/22    Requested Prescriptions   Pending Prescriptions Disp Refills     varenicline (CHANTIX) 1 MG tablet [Pharmacy Med Name: VARENICLINE 1MG TABLETS] 60 tablet      Sig: TAKE 1 TABLET BY MOUTH TWICE DAILY AFTER EATING WITH A FULL GLASS OF WATER       Partial Cholinergic Nicotinic Agonist Agents Failed - 9/20/2022  7:54 AM        Failed - Blood pressure under 140/90 in past 12 months     BP Readings from Last 3 Encounters:   09/13/22 (!) 144/60   09/13/22 (!) 150/80   09/08/22 (!) 160/66                 Failed - Medication is active on med list        Passed - Recent (12 mo) or future (30 days) visit within the authorizing provider's specialty     Patient has had an office visit with the authorizing provider or a provider within the authorizing providers department within the previous 12 mos or has a future within next 30 days. See \"Patient Info\" tab in inbasket, or \"Choose Columns\" in Meds & Orders section of the refill encounter.              Passed - Patient is 18 years of age or older        Passed - Patient is not pregnant        Passed - No positive pregnancy test on file in past 12 months           montelukast (SINGULAIR) 10 MG tablet [Pharmacy Med Name: MONTELUKAST 10MG TABLETS] 90 tablet 1     Sig: TAKE 1 TABLET(10 MG) BY MOUTH EVERY 24 HOURS       Leukotriene Inhibitors Protocol Failed - 9/20/2022  7:55 AM        Failed - Asthma control assessment score within normal limits in last 6 months     Please review ACT score.           Passed - Patient is age 12 or older     If patient is under 16, ok to refill using age based dosing. " "          Passed - Medication is active on med list        Passed - Recent (6 mo) or future (30 days) visit within the authorizing provider's specialty     Patient had office visit in the last 6 months or has a visit in the next 30 days with authorizing provider or within the authorizing provider's specialty.  See \"Patient Info\" tab in inbasket, or \"Choose Columns\" in Meds & Orders section of the refill encounter.               vitamin D2 (ERGOCALCIFEROL) 15856 units (1250 mcg) capsule [Pharmacy Med Name: VITAMIN D2 50,000IU (ERGO) CAP RX] 13 capsule 0     Sig: TAKE 1 CAPSULE BY MOUTH 1 TIME A WEEK       There is no refill protocol information for this order          Robert Pavon RN 09/20/22 7:55 AM  " syncope

## 2024-05-06 NOTE — ED ADULT NURSE NOTE - BEFAST SCREENING
----- Message from Ashley Baker sent at 3/15/2018  8:13 AM CDT -----  Contact: Mother Hemalatha Garcia  Mother is requesting an updated copy of the patients shot records.  She would like to pick it up around 4PM today.  Call back at [phone] (home).  Thank you!    
Mother notified that immunization record is ready to be picked up  
Negative

## 2024-05-07 DIAGNOSIS — R55 SYNCOPE AND COLLAPSE: ICD-10-CM

## 2024-05-13 RX ORDER — VALSARTAN 160 MG/1
160 TABLET, COATED ORAL DAILY
Qty: 90 | Refills: 3 | Status: ACTIVE | COMMUNITY
Start: 2022-04-20 | End: 1900-01-01

## 2024-05-13 RX ORDER — ASPIRIN ENTERIC COATED TABLETS 81 MG 81 MG/1
81 TABLET, DELAYED RELEASE ORAL
Qty: 90 | Refills: 3 | Status: ACTIVE | COMMUNITY
Start: 2022-04-20 | End: 1900-01-01

## 2024-05-15 ENCOUNTER — RX RENEWAL (OUTPATIENT)
Age: 69
End: 2024-05-15

## 2024-05-22 ENCOUNTER — NON-APPOINTMENT (OUTPATIENT)
Age: 69
End: 2024-05-22

## 2024-05-24 ENCOUNTER — APPOINTMENT (OUTPATIENT)
Dept: CARDIOLOGY | Facility: CLINIC | Age: 69
End: 2024-05-24
Payer: MEDICARE

## 2024-05-24 PROCEDURE — 93306 TTE W/DOPPLER COMPLETE: CPT

## 2024-06-10 ENCOUNTER — APPOINTMENT (OUTPATIENT)
Dept: UROLOGY | Facility: CLINIC | Age: 69
End: 2024-06-10

## 2024-07-01 LAB — PSA SERPL-MCNC: <0.01 NG/ML

## 2024-07-09 ENCOUNTER — NON-APPOINTMENT (OUTPATIENT)
Age: 69
End: 2024-07-09

## 2024-07-22 ENCOUNTER — APPOINTMENT (OUTPATIENT)
Dept: DERMATOLOGY | Facility: CLINIC | Age: 69
End: 2024-07-22
Payer: MEDICARE

## 2024-07-22 PROCEDURE — 99203 OFFICE O/P NEW LOW 30 MIN: CPT

## 2024-07-30 ENCOUNTER — APPOINTMENT (OUTPATIENT)
Dept: CARDIOLOGY | Facility: CLINIC | Age: 69
End: 2024-07-30
Payer: MEDICARE

## 2024-07-30 ENCOUNTER — NON-APPOINTMENT (OUTPATIENT)
Age: 69
End: 2024-07-30

## 2024-07-30 VITALS
HEART RATE: 57 BPM | SYSTOLIC BLOOD PRESSURE: 138 MMHG | OXYGEN SATURATION: 97 % | BODY MASS INDEX: 24.11 KG/M2 | WEIGHT: 178 LBS | DIASTOLIC BLOOD PRESSURE: 82 MMHG | HEIGHT: 72 IN

## 2024-07-30 DIAGNOSIS — Z00.00 ENCOUNTER FOR GENERAL ADULT MEDICAL EXAMINATION W/OUT ABNORMAL FINDINGS: ICD-10-CM

## 2024-07-30 DIAGNOSIS — R03.0 ELEVATED BLOOD-PRESSURE READING, W/OUT DIAGNOSIS OF HYPERTENSION: ICD-10-CM

## 2024-07-30 DIAGNOSIS — Z01.810 ENCOUNTER FOR PREPROCEDURAL CARDIOVASCULAR EXAMINATION: ICD-10-CM

## 2024-07-30 PROCEDURE — 93000 ELECTROCARDIOGRAM COMPLETE: CPT

## 2024-07-30 PROCEDURE — 99215 OFFICE O/P EST HI 40 MIN: CPT

## 2024-07-30 PROCEDURE — G2211 COMPLEX E/M VISIT ADD ON: CPT

## 2024-07-30 NOTE — HISTORY OF PRESENT ILLNESS
[FreeTextEntry1] : family history coronary artery disease   . HPI for today:  7 30 2024":   fee;ls good. no chest pain . no dyspnea on exertion .  no dizizness. compliant with meds.  feels good.  got prostate surgery done.  for prostate cancer   recently he was in hospital not feeling good. we cut the metoprolol from 50 to 25.  adn valsartan from 160 --> 80   old note:  : he has sniffles for long time.  no chest pain .  no dyspnea.   dizziness. when he bends and get up.   no syncope. no palp]itaitons he had an MI in apr 2022: s/p PCi.  on Dual antiplatelet therapy . statins.   old note: This is a 66 year male with  dyslipidemia , here for coronary artery disease evaluation and elevated blood pressure without diagnosis of hypertension   he is here for coronary artery disease evaluation.   last time he checked his heart was 5 years ago.  no dizziness. no syncope. no palpitaitons. no dyspnea on exertion . no chest pain . no skipped heart beats. family history :  Mother: CABg and PACemaker father: cancer.  Smoking: half a cigar a day.   Smoking cigarettes . a pack a day. quit smoking 15 years ago.  he smoked a pack a day for 30 years.  as

## 2024-07-30 NOTE — DISCUSSION/SUMMARY
[Patient] : the patient [Risks] : risks [Benefits] : benefits [Alternatives] : alternatives [With Me] : with me [___ Month(s)] : in [unfilled] month(s) [FreeTextEntry1] : This is a 69 year male with  dyslipidemia , here for coronary artery disease evaluation and elevated blood pressure without diagnosis of hypertension    1) hypertension :  on beta blocker and ARb.  2) coronary artery disease: s/p PCi : apr 2022:  antiplatelet therapy aspiprin 81 . statins.  lipitor to 40 mg.   zetia 10 mg LDL goal. < 50.  as lipoprontien a level is 200.  receht lipid porifle.  3) prior smoking.: Extensive > 30 pack year history of smoiking  stopped smoking. 4) claudicaiton: Mildly abnormal.  deferred repeat testing. if symptoms worsen then repeat Harsh and US arterial Duplex  [EKG obtained to assist in diagnosis and management of assessed problem(s)] : EKG obtained to assist in diagnosis and management of assessed problem(s)

## 2024-07-30 NOTE — CARDIOLOGY SUMMARY
[de-identified] : 7 30 2024: Sinus Bradycardia  WITHIN NORMAL LIMITS   11 7 2023 :" Sinus Bradycardia  WITHIN NORMAL LIMITS  11 8 2022  3 15 2022 Sinus  Rhythm  WITHIN NORMAL LIMITS  [de-identified] : may 2024:  4 weeks zoran manning  [de-identified] : may 2024:  LVEF . normal RV normal.  mild TR  [de-identified] : Aprr:  lateral STEMI, LHC revealed 100% occluded Diag 2 with DESx1 (LYNNE 2.5x26mm).   [de-identified] : july 2023:  Mild plaque.  no stenosis  [de-identified] : jan 2024:  LDL : 76.  HDL: 38.  Total: 124   ; Tgs: 47   Lipoprotein:  200.

## 2024-07-31 ENCOUNTER — APPOINTMENT (OUTPATIENT)
Dept: FAMILY MEDICINE | Facility: CLINIC | Age: 69
End: 2024-07-31
Payer: MEDICARE

## 2024-07-31 VITALS
SYSTOLIC BLOOD PRESSURE: 128 MMHG | BODY MASS INDEX: 24.52 KG/M2 | OXYGEN SATURATION: 98 % | WEIGHT: 181 LBS | HEART RATE: 61 BPM | HEIGHT: 72 IN | DIASTOLIC BLOOD PRESSURE: 78 MMHG

## 2024-07-31 DIAGNOSIS — I10 ESSENTIAL (PRIMARY) HYPERTENSION: ICD-10-CM

## 2024-07-31 DIAGNOSIS — Z76.89 PERSONS ENCOUNTERING HEALTH SERVICES IN OTHER SPECIFIED CIRCUMSTANCES: ICD-10-CM

## 2024-07-31 DIAGNOSIS — I25.10 ATHEROSCLEROTIC HEART DISEASE OF NATIVE CORONARY ARTERY W/OUT ANGINA PECTORIS: ICD-10-CM

## 2024-07-31 DIAGNOSIS — E78.5 HYPERLIPIDEMIA, UNSPECIFIED: ICD-10-CM

## 2024-07-31 PROCEDURE — 99214 OFFICE O/P EST MOD 30 MIN: CPT

## 2024-08-04 PROBLEM — Z76.89 ENCOUNTER TO ESTABLISH CARE WITH NEW DOCTOR: Status: ACTIVE | Noted: 2024-08-04

## 2024-08-04 NOTE — HEALTH RISK ASSESSMENT
[Good] : ~his/her~  mood as  good [Former] : Former [20 or more] : 20 or more [< 15 Years] : < 15 Years [de-identified] : 40 pack years,

## 2024-08-04 NOTE — HISTORY OF PRESENT ILLNESS
[FreeTextEntry1] : PARTH [de-identified] : Mr. RANDI HERNANDEZ is a 69-year male with a PMH of HTN, HLD, prostate cancer s/p prostatectomy, CAD s/p PCI who presents to establish care as previous PCP left practice.

## 2024-08-07 LAB
CHOLEST SERPL-MCNC: 125 MG/DL
ESTIMATED AVERAGE GLUCOSE: 131 MG/DL
HBA1C MFR BLD HPLC: 6.2 %
HDLC SERPL-MCNC: 39 MG/DL
LDLC SERPL CALC-MCNC: 71 MG/DL
NONHDLC SERPL-MCNC: 86 MG/DL
TRIGL SERPL-MCNC: 77 MG/DL
TSH SERPL-ACNC: 3.17 UIU/ML

## 2024-09-24 ENCOUNTER — LABORATORY RESULT (OUTPATIENT)
Age: 69
End: 2024-09-24

## 2024-09-30 ENCOUNTER — RX RENEWAL (OUTPATIENT)
Age: 69
End: 2024-09-30

## 2024-10-02 ENCOUNTER — APPOINTMENT (OUTPATIENT)
Dept: UROLOGY | Facility: CLINIC | Age: 69
End: 2024-10-02

## 2024-10-02 VITALS
SYSTOLIC BLOOD PRESSURE: 129 MMHG | HEART RATE: 64 BPM | BODY MASS INDEX: 23.7 KG/M2 | WEIGHT: 175 LBS | OXYGEN SATURATION: 98 % | RESPIRATION RATE: 16 BRPM | HEIGHT: 72 IN | DIASTOLIC BLOOD PRESSURE: 75 MMHG

## 2024-10-02 DIAGNOSIS — C61 MALIGNANT NEOPLASM OF PROSTATE: ICD-10-CM

## 2024-10-02 PROCEDURE — 99213 OFFICE O/P EST LOW 20 MIN: CPT

## 2024-10-02 NOTE — HISTORY OF PRESENT ILLNESS
[FreeTextEntry1] : 68 yo male presents today for a follow-up appointment for prostate cancer.  S/p RALP 2/22/24- GS7 (3+4)  Recent PSA: <0.01 (9.24.24) Previous PSA: <0.01 (6.28.24)  Denies any urinary complaints at this time and is using 1 pad/day for minor leakage. Poor erections- discontinued Tadalafil on his own due to Epistaxis.

## 2024-10-02 NOTE — HISTORY OF PRESENT ILLNESS
[FreeTextEntry1] : 70 yo male presents today for a follow-up appointment for prostate cancer.  S/p RALP 2/22/24- GS7 (3+4)  Recent PSA: <0.01 (9.24.24) Previous PSA: <0.01 (6.28.24)  Denies any urinary complaints at this time and is using 1 pad/day for minor leakage. Poor erections- discontinued Tadalafil on his own due to Epistaxis.

## 2024-10-02 NOTE — ASSESSMENT
[FreeTextEntry1] : PSA is undetectable.  He would like to try Viagra for ED. Due to his history of MI (4 years ago) and episodes of Epistaxis on Tadalafil, I recommend to follow-up with his Cardiologist before administration of this medication.  Repeat PSA in 6 months. Next follow-up appointment in 6 months. All questions answered patient agreeable with plan. 20-minute discussion for prostate cancer follow-up and review of labs.

## 2024-10-02 NOTE — DISCHARGE NOTE PROVIDER - NSDCQMSTROKE_NEU_ALL_CORE
Stable  Hx/o atypical and typical CP  EKG (2/13/18): NSR, HR 84, no ST elevation or depressions. No T waves inversions. Normal EKG  TTE (3/23/18): neg, EF: 55-60%  Holter (4/1/18): sinus rhythm with PACs  CTA coronary (4/11/18): neg  S/p cardio eval (3/2018)  Monitor and consider referral if symptoms recur   No

## 2024-10-08 ENCOUNTER — RX RENEWAL (OUTPATIENT)
Age: 69
End: 2024-10-08

## 2025-06-03 ENCOUNTER — TRANSCRIPTION ENCOUNTER (OUTPATIENT)
Age: 70
End: 2025-06-03

## 2025-06-03 ENCOUNTER — APPOINTMENT (OUTPATIENT)
Dept: CARDIOLOGY | Facility: CLINIC | Age: 70
End: 2025-06-03

## 2025-06-04 ENCOUNTER — TRANSCRIPTION ENCOUNTER (OUTPATIENT)
Age: 70
End: 2025-06-04

## 2025-06-05 ENCOUNTER — APPOINTMENT (OUTPATIENT)
Dept: DERMATOLOGY | Facility: CLINIC | Age: 70
End: 2025-06-05
Payer: MEDICARE

## 2025-06-05 PROCEDURE — 99212 OFFICE O/P EST SF 10 MIN: CPT | Mod: 25

## 2025-06-05 PROCEDURE — 11103 TANGNTL BX SKIN EA SEP/ADDL: CPT

## 2025-06-05 PROCEDURE — 11102 TANGNTL BX SKIN SINGLE LES: CPT

## 2025-06-07 LAB
CHOLEST SERPL-MCNC: 116 MG/DL
HDLC SERPL-MCNC: 36 MG/DL
LDLC SERPL-MCNC: 65 MG/DL
NONHDLC SERPL-MCNC: 80 MG/DL
TRIGL SERPL-MCNC: 74 MG/DL

## 2025-06-10 ENCOUNTER — NON-APPOINTMENT (OUTPATIENT)
Age: 70
End: 2025-06-10

## 2025-06-12 ENCOUNTER — APPOINTMENT (OUTPATIENT)
Dept: DERMATOLOGY | Facility: CLINIC | Age: 70
End: 2025-06-12
Payer: MEDICARE

## 2025-06-12 PROCEDURE — 99213 OFFICE O/P EST LOW 20 MIN: CPT

## 2025-07-07 ENCOUNTER — APPOINTMENT (OUTPATIENT)
Dept: UROLOGY | Facility: CLINIC | Age: 70
End: 2025-07-07
Payer: MEDICARE

## 2025-07-07 VITALS
HEART RATE: 59 BPM | OXYGEN SATURATION: 98 % | DIASTOLIC BLOOD PRESSURE: 71 MMHG | RESPIRATION RATE: 16 BRPM | HEIGHT: 72 IN | SYSTOLIC BLOOD PRESSURE: 128 MMHG | BODY MASS INDEX: 24.92 KG/M2 | WEIGHT: 184 LBS

## 2025-07-07 PROCEDURE — 99213 OFFICE O/P EST LOW 20 MIN: CPT

## 2025-07-21 ENCOUNTER — APPOINTMENT (OUTPATIENT)
Dept: DERMATOLOGY | Facility: CLINIC | Age: 70
End: 2025-07-21
Payer: MEDICARE

## 2025-07-21 DIAGNOSIS — C44.90 UNSPECIFIED MALIGNANT NEOPLASM OF SKIN, UNSPECIFIED: ICD-10-CM

## 2025-07-21 DIAGNOSIS — C44.311 BASAL CELL CARCINOMA OF SKIN OF NOSE: ICD-10-CM

## 2025-07-21 PROCEDURE — 99214 OFFICE O/P EST MOD 30 MIN: CPT

## 2025-07-22 ENCOUNTER — APPOINTMENT (OUTPATIENT)
Dept: DERMATOLOGY | Facility: CLINIC | Age: 70
End: 2025-07-22
Payer: MEDICARE

## 2025-07-22 DIAGNOSIS — D23.30 OTHER BENIGN NEOPLASM OF SKIN OF UNSPECIFIED PART OF FACE: ICD-10-CM

## 2025-07-22 PROBLEM — C44.90: Status: ACTIVE | Noted: 2025-07-22

## 2025-07-22 PROBLEM — C44.311 BASAL CELL CARCINOMA OF DORSUM OF NOSE: Status: ACTIVE | Noted: 2025-07-22

## 2025-07-22 PROCEDURE — 11442 EXC FACE-MM B9+MARG 1.1-2 CM: CPT

## 2025-07-22 PROCEDURE — 12052 INTMD RPR FACE/MM 2.6-5.0 CM: CPT

## 2025-07-22 RX ORDER — MUPIROCIN 20 MG/G
2 OINTMENT TOPICAL TWICE DAILY
Qty: 1 | Refills: 6 | Status: ACTIVE | COMMUNITY
Start: 2025-07-22 | End: 1900-01-01

## 2025-07-28 ENCOUNTER — TRANSCRIPTION ENCOUNTER (OUTPATIENT)
Age: 70
End: 2025-07-28

## 2025-08-01 ENCOUNTER — TRANSCRIPTION ENCOUNTER (OUTPATIENT)
Age: 70
End: 2025-08-01

## 2025-08-06 ENCOUNTER — NON-APPOINTMENT (OUTPATIENT)
Age: 70
End: 2025-08-06

## 2025-08-06 ENCOUNTER — APPOINTMENT (OUTPATIENT)
Dept: CARDIOLOGY | Facility: CLINIC | Age: 70
End: 2025-08-06
Payer: MEDICARE

## 2025-08-06 VITALS
HEIGHT: 72 IN | OXYGEN SATURATION: 95 % | HEART RATE: 65 BPM | DIASTOLIC BLOOD PRESSURE: 72 MMHG | BODY MASS INDEX: 24.92 KG/M2 | SYSTOLIC BLOOD PRESSURE: 129 MMHG | WEIGHT: 184 LBS

## 2025-08-06 DIAGNOSIS — R55 SYNCOPE AND COLLAPSE: ICD-10-CM

## 2025-08-06 DIAGNOSIS — R03.0 ELEVATED BLOOD-PRESSURE READING, W/OUT DIAGNOSIS OF HYPERTENSION: ICD-10-CM

## 2025-08-06 DIAGNOSIS — I21.29 ST ELEVATION (STEMI) MYOCARDIAL INFARCTION INVOLVING OTHER SITES: ICD-10-CM

## 2025-08-06 PROCEDURE — 99215 OFFICE O/P EST HI 40 MIN: CPT

## 2025-08-06 PROCEDURE — G2211 COMPLEX E/M VISIT ADD ON: CPT

## 2025-08-08 RX ORDER — ATORVASTATIN CALCIUM 20 MG/1
20 TABLET, FILM COATED ORAL
Qty: 90 | Refills: 3 | Status: ACTIVE | COMMUNITY
Start: 2025-08-08 | End: 1900-01-01

## 2025-08-12 ENCOUNTER — APPOINTMENT (OUTPATIENT)
Dept: FAMILY MEDICINE | Facility: CLINIC | Age: 70
End: 2025-08-12
Payer: MEDICARE

## 2025-08-12 VITALS
DIASTOLIC BLOOD PRESSURE: 78 MMHG | SYSTOLIC BLOOD PRESSURE: 122 MMHG | HEART RATE: 59 BPM | WEIGHT: 184 LBS | BODY MASS INDEX: 24.92 KG/M2 | HEIGHT: 72 IN | OXYGEN SATURATION: 98 %

## 2025-08-12 DIAGNOSIS — Z13.29 ENCOUNTER FOR SCREENING FOR OTHER SUSPECTED ENDOCRINE DISORDER: ICD-10-CM

## 2025-08-12 DIAGNOSIS — Z12.2 ENCOUNTER FOR SCREENING FOR MALIGNANT NEOPLASM OF RESPIRATORY ORGANS: ICD-10-CM

## 2025-08-12 DIAGNOSIS — I10 ESSENTIAL (PRIMARY) HYPERTENSION: ICD-10-CM

## 2025-08-12 DIAGNOSIS — I25.10 ATHEROSCLEROTIC HEART DISEASE OF NATIVE CORONARY ARTERY W/OUT ANGINA PECTORIS: ICD-10-CM

## 2025-08-12 DIAGNOSIS — E78.5 HYPERLIPIDEMIA, UNSPECIFIED: ICD-10-CM

## 2025-08-12 DIAGNOSIS — Z00.00 ENCOUNTER FOR GENERAL ADULT MEDICAL EXAMINATION W/OUT ABNORMAL FINDINGS: ICD-10-CM

## 2025-08-12 PROCEDURE — G0439: CPT

## 2025-08-12 PROCEDURE — G0296 VISIT TO DETERM LDCT ELIG: CPT

## 2025-08-13 PROBLEM — Z00.00 ENCOUNTER FOR MEDICARE ANNUAL WELLNESS EXAM: Status: ACTIVE | Noted: 2025-08-13

## 2025-08-13 PROBLEM — Z13.29 SCREENING FOR THYROID DISORDER: Status: ACTIVE | Noted: 2025-08-13

## 2025-08-15 ENCOUNTER — APPOINTMENT (OUTPATIENT)
Dept: DERMATOLOGY | Facility: CLINIC | Age: 70
End: 2025-08-15
Payer: MEDICARE

## 2025-08-15 PROCEDURE — 99213 OFFICE O/P EST LOW 20 MIN: CPT
